# Patient Record
Sex: FEMALE | Race: WHITE | Employment: FULL TIME | URBAN - METROPOLITAN AREA
[De-identification: names, ages, dates, MRNs, and addresses within clinical notes are randomized per-mention and may not be internally consistent; named-entity substitution may affect disease eponyms.]

---

## 2017-05-15 DIAGNOSIS — Z30.41 ENCOUNTER FOR SURVEILLANCE OF CONTRACEPTIVE PILLS: ICD-10-CM

## 2017-05-16 NOTE — TELEPHONE ENCOUNTER
TC's: Please call Patient to schedule physical with PCP, then route back to the refill pool    Thank you    Mary Sandoval RN

## 2017-05-17 RX ORDER — NORETHINDRONE ACETATE AND ETHINYL ESTRADIOL 1.5; 3 MG/1; UG/1
TABLET ORAL
Qty: 84 TABLET | Refills: 3 | OUTPATIENT
Start: 2017-05-17

## 2017-05-21 ENCOUNTER — TELEPHONE (OUTPATIENT)
Dept: FAMILY MEDICINE | Facility: CLINIC | Age: 27
End: 2017-05-21

## 2017-05-21 DIAGNOSIS — R23.2 FLUSHING: ICD-10-CM

## 2017-05-21 DIAGNOSIS — F41.9 ANXIETY: ICD-10-CM

## 2017-05-22 NOTE — TELEPHONE ENCOUNTER
citalopram (CELEXA) 20 MG tablet 90 tablet 3 3/25/2016          Last Written Prescription Date: 3/25/16  Last Fill Quantity: 90, # refills: 3  Last Office Visit with [facility] primary care provider:  3/25/16        Last PHQ-9 score on record= No flowsheet data found.

## 2017-05-22 NOTE — TELEPHONE ENCOUNTER
TCs: Please call patient.   LOV was over 1 year ago.   Once scheduled, please route back to refills    Thank you!    Kaur Mcrae RN

## 2017-06-06 RX ORDER — CITALOPRAM HYDROBROMIDE 20 MG/1
TABLET ORAL
Qty: 90 TABLET | Refills: 2 | OUTPATIENT
Start: 2017-06-06

## 2017-06-06 NOTE — TELEPHONE ENCOUNTER
See previous refill encounter dated 5/15.  Patient seeing different provider outside [place].  Isabelle Neely RN

## 2018-04-23 ENCOUNTER — HEALTH MAINTENANCE LETTER (OUTPATIENT)
Age: 28
End: 2018-04-23

## 2020-02-20 ENCOUNTER — APPOINTMENT (OUTPATIENT)
Dept: MRI IMAGING | Facility: CLINIC | Age: 30
DRG: 818 | End: 2020-02-20
Attending: EMERGENCY MEDICINE
Payer: OTHER GOVERNMENT

## 2020-02-20 ENCOUNTER — HOSPITAL ENCOUNTER (INPATIENT)
Facility: CLINIC | Age: 30
LOS: 1 days | Discharge: HOME | DRG: 818 | End: 2020-02-22
Attending: EMERGENCY MEDICINE | Admitting: SURGERY
Payer: OTHER GOVERNMENT

## 2020-02-20 ENCOUNTER — APPOINTMENT (OUTPATIENT)
Dept: ULTRASOUND IMAGING | Facility: CLINIC | Age: 30
DRG: 818 | End: 2020-02-20
Attending: EMERGENCY MEDICINE
Payer: OTHER GOVERNMENT

## 2020-02-20 ENCOUNTER — ANESTHESIA (OUTPATIENT)
Dept: SURGERY | Facility: CLINIC | Age: 30
DRG: 818 | End: 2020-02-20
Payer: OTHER GOVERNMENT

## 2020-02-20 ENCOUNTER — ANESTHESIA EVENT (OUTPATIENT)
Dept: SURGERY | Facility: CLINIC | Age: 30
DRG: 818 | End: 2020-02-20
Payer: OTHER GOVERNMENT

## 2020-02-20 ENCOUNTER — SURGERY (OUTPATIENT)
Age: 30
End: 2020-02-20
Payer: OTHER GOVERNMENT

## 2020-02-20 DIAGNOSIS — K56.609 SBO (SMALL BOWEL OBSTRUCTION) (H): ICD-10-CM

## 2020-02-20 DIAGNOSIS — G89.18 POSTOPERATIVE PAIN: Primary | ICD-10-CM

## 2020-02-20 DIAGNOSIS — Z3A.01 LESS THAN 8 WEEKS GESTATION OF PREGNANCY: ICD-10-CM

## 2020-02-20 LAB
ALBUMIN SERPL-MCNC: 4 G/DL (ref 3.4–5)
ALBUMIN UR-MCNC: NEGATIVE MG/DL
ALP SERPL-CCNC: 59 U/L (ref 40–150)
ALT SERPL W P-5'-P-CCNC: 19 U/L (ref 0–50)
ANION GAP SERPL CALCULATED.3IONS-SCNC: 6 MMOL/L (ref 3–14)
APPEARANCE UR: CLEAR
AST SERPL W P-5'-P-CCNC: 13 U/L (ref 0–45)
BASOPHILS # BLD AUTO: 0 10E9/L (ref 0–0.2)
BASOPHILS NFR BLD AUTO: 0.2 %
BILIRUB SERPL-MCNC: 0.3 MG/DL (ref 0.2–1.3)
BILIRUB UR QL STRIP: NEGATIVE
BUN SERPL-MCNC: 7 MG/DL (ref 7–30)
CALCIUM SERPL-MCNC: 8.8 MG/DL (ref 8.5–10.1)
CHLORIDE SERPL-SCNC: 104 MMOL/L (ref 94–109)
CO2 SERPL-SCNC: 26 MMOL/L (ref 20–32)
COLOR UR AUTO: ABNORMAL
CREAT SERPL-MCNC: 0.73 MG/DL (ref 0.52–1.04)
DIFFERENTIAL METHOD BLD: NORMAL
EOSINOPHIL # BLD AUTO: 0.1 10E9/L (ref 0–0.7)
EOSINOPHIL NFR BLD AUTO: 0.7 %
ERYTHROCYTE [DISTWIDTH] IN BLOOD BY AUTOMATED COUNT: 13.2 % (ref 10–15)
GFR SERPL CREATININE-BSD FRML MDRD: >90 ML/MIN/{1.73_M2}
GLUCOSE SERPL-MCNC: 82 MG/DL (ref 70–99)
GLUCOSE UR STRIP-MCNC: NEGATIVE MG/DL
HCT VFR BLD AUTO: 41.2 % (ref 35–47)
HGB BLD-MCNC: 13.7 G/DL (ref 11.7–15.7)
HGB UR QL STRIP: NEGATIVE
IMM GRANULOCYTES # BLD: 0.1 10E9/L (ref 0–0.4)
IMM GRANULOCYTES NFR BLD: 0.5 %
KETONES UR STRIP-MCNC: NEGATIVE MG/DL
LEUKOCYTE ESTERASE UR QL STRIP: NEGATIVE
LIPASE SERPL-CCNC: 135 U/L (ref 73–393)
LYMPHOCYTES # BLD AUTO: 1.8 10E9/L (ref 0.8–5.3)
LYMPHOCYTES NFR BLD AUTO: 18.2 %
MCH RBC QN AUTO: 30.3 PG (ref 26.5–33)
MCHC RBC AUTO-ENTMCNC: 33.3 G/DL (ref 31.5–36.5)
MCV RBC AUTO: 91 FL (ref 78–100)
MONOCYTES # BLD AUTO: 0.7 10E9/L (ref 0–1.3)
MONOCYTES NFR BLD AUTO: 7.2 %
NEUTROPHILS # BLD AUTO: 7.1 10E9/L (ref 1.6–8.3)
NEUTROPHILS NFR BLD AUTO: 73.2 %
NITRATE UR QL: NEGATIVE
NRBC # BLD AUTO: 0 10*3/UL
NRBC BLD AUTO-RTO: 0 /100
PH UR STRIP: 6.5 PH (ref 5–7)
PLATELET # BLD AUTO: 241 10E9/L (ref 150–450)
POTASSIUM SERPL-SCNC: 3.3 MMOL/L (ref 3.4–5.3)
PROT SERPL-MCNC: 7.2 G/DL (ref 6.8–8.8)
RBC # BLD AUTO: 4.52 10E12/L (ref 3.8–5.2)
SODIUM SERPL-SCNC: 136 MMOL/L (ref 133–144)
SOURCE: ABNORMAL
SP GR UR STRIP: 1 (ref 1–1.03)
UROBILINOGEN UR STRIP-MCNC: NORMAL MG/DL (ref 0–2)
WBC # BLD AUTO: 9.6 10E9/L (ref 4–11)

## 2020-02-20 PROCEDURE — 44180 LAP ENTEROLYSIS: CPT | Performed by: SURGERY

## 2020-02-20 PROCEDURE — 36000056 ZZH SURGERY LEVEL 3 1ST 30 MIN: Performed by: SURGERY

## 2020-02-20 PROCEDURE — 27210794 ZZH OR GENERAL SUPPLY STERILE: Performed by: SURGERY

## 2020-02-20 PROCEDURE — 25000125 ZZHC RX 250: Performed by: EMERGENCY MEDICINE

## 2020-02-20 PROCEDURE — 25800030 ZZH RX IP 258 OP 636: Performed by: NURSE ANESTHETIST, CERTIFIED REGISTERED

## 2020-02-20 PROCEDURE — 25800030 ZZH RX IP 258 OP 636: Performed by: EMERGENCY MEDICINE

## 2020-02-20 PROCEDURE — 99285 EMERGENCY DEPT VISIT HI MDM: CPT | Mod: 25

## 2020-02-20 PROCEDURE — 76817 TRANSVAGINAL US OBSTETRIC: CPT

## 2020-02-20 PROCEDURE — 25000128 H RX IP 250 OP 636: Performed by: NURSE ANESTHETIST, CERTIFIED REGISTERED

## 2020-02-20 PROCEDURE — 96374 THER/PROPH/DIAG INJ IV PUSH: CPT | Mod: 59

## 2020-02-20 PROCEDURE — 83690 ASSAY OF LIPASE: CPT | Performed by: EMERGENCY MEDICINE

## 2020-02-20 PROCEDURE — 40000170 ZZH STATISTIC PRE-PROCEDURE ASSESSMENT II: Performed by: SURGERY

## 2020-02-20 PROCEDURE — 25000125 ZZHC RX 250: Performed by: NURSE ANESTHETIST, CERTIFIED REGISTERED

## 2020-02-20 PROCEDURE — 25800025 ZZH RX 258: Performed by: SURGERY

## 2020-02-20 PROCEDURE — 36000058 ZZH SURGERY LEVEL 3 EA 15 ADDTL MIN: Performed by: SURGERY

## 2020-02-20 PROCEDURE — 71000012 ZZH RECOVERY PHASE 1 LEVEL 1 FIRST HR: Performed by: SURGERY

## 2020-02-20 PROCEDURE — 25000132 ZZH RX MED GY IP 250 OP 250 PS 637: Performed by: EMERGENCY MEDICINE

## 2020-02-20 PROCEDURE — 37000009 ZZH ANESTHESIA TECHNICAL FEE, EACH ADDTL 15 MIN: Performed by: SURGERY

## 2020-02-20 PROCEDURE — 25000566 ZZH SEVOFLURANE, EA 15 MIN: Performed by: SURGERY

## 2020-02-20 PROCEDURE — 44180 LAP ENTEROLYSIS: CPT | Mod: AS | Performed by: PHYSICIAN ASSISTANT

## 2020-02-20 PROCEDURE — 76705 ECHO EXAM OF ABDOMEN: CPT

## 2020-02-20 PROCEDURE — 81003 URINALYSIS AUTO W/O SCOPE: CPT | Performed by: EMERGENCY MEDICINE

## 2020-02-20 PROCEDURE — 85025 COMPLETE CBC W/AUTO DIFF WBC: CPT | Performed by: EMERGENCY MEDICINE

## 2020-02-20 PROCEDURE — 99222 1ST HOSP IP/OBS MODERATE 55: CPT | Mod: 57 | Performed by: SURGERY

## 2020-02-20 PROCEDURE — 74181 MRI ABDOMEN W/O CONTRAST: CPT

## 2020-02-20 PROCEDURE — 37000008 ZZH ANESTHESIA TECHNICAL FEE, 1ST 30 MIN: Performed by: SURGERY

## 2020-02-20 PROCEDURE — 25000125 ZZHC RX 250: Performed by: SURGERY

## 2020-02-20 PROCEDURE — 80053 COMPREHEN METABOLIC PANEL: CPT | Performed by: EMERGENCY MEDICINE

## 2020-02-20 RX ORDER — PNV NO.95/FERROUS FUM/FOLIC AC 28MG-0.8MG
1 TABLET ORAL DAILY
COMMUNITY

## 2020-02-20 RX ORDER — SODIUM CHLORIDE 9 MG/ML
INJECTION, SOLUTION INTRAVENOUS CONTINUOUS PRN
Status: DISCONTINUED | OUTPATIENT
Start: 2020-02-20 | End: 2020-02-21

## 2020-02-20 RX ORDER — LIDOCAINE HYDROCHLORIDE 20 MG/ML
INJECTION, SOLUTION INFILTRATION; PERINEURAL PRN
Status: DISCONTINUED | OUTPATIENT
Start: 2020-02-20 | End: 2020-02-21

## 2020-02-20 RX ORDER — SODIUM CHLORIDE, SODIUM LACTATE, POTASSIUM CHLORIDE, CALCIUM CHLORIDE 600; 310; 30; 20 MG/100ML; MG/100ML; MG/100ML; MG/100ML
INJECTION, SOLUTION INTRAVENOUS CONTINUOUS PRN
Status: DISCONTINUED | OUTPATIENT
Start: 2020-02-20 | End: 2020-02-20

## 2020-02-20 RX ORDER — FENTANYL CITRATE 50 UG/ML
INJECTION, SOLUTION INTRAMUSCULAR; INTRAVENOUS PRN
Status: DISCONTINUED | OUTPATIENT
Start: 2020-02-20 | End: 2020-02-21

## 2020-02-20 RX ORDER — PROPOFOL 10 MG/ML
INJECTION, EMULSION INTRAVENOUS PRN
Status: DISCONTINUED | OUTPATIENT
Start: 2020-02-20 | End: 2020-02-21

## 2020-02-20 RX ORDER — ACETAMINOPHEN 325 MG/1
650 TABLET ORAL ONCE
Status: COMPLETED | OUTPATIENT
Start: 2020-02-20 | End: 2020-02-20

## 2020-02-20 RX ORDER — SODIUM CHLORIDE, SODIUM LACTATE, POTASSIUM CHLORIDE, CALCIUM CHLORIDE 600; 310; 30; 20 MG/100ML; MG/100ML; MG/100ML; MG/100ML
INJECTION, SOLUTION INTRAVENOUS CONTINUOUS
Status: CANCELLED | OUTPATIENT
Start: 2020-02-20

## 2020-02-20 RX ORDER — MAGNESIUM HYDROXIDE 1200 MG/15ML
LIQUID ORAL PRN
Status: DISCONTINUED | OUTPATIENT
Start: 2020-02-20 | End: 2020-02-21 | Stop reason: HOSPADM

## 2020-02-20 RX ORDER — CLINDAMYCIN PHOSPHATE 600 MG/50ML
600 INJECTION, SOLUTION INTRAVENOUS ONCE
Status: COMPLETED | OUTPATIENT
Start: 2020-02-20 | End: 2020-02-20

## 2020-02-20 RX ORDER — PROPOFOL 10 MG/ML
INJECTION, EMULSION INTRAVENOUS CONTINUOUS PRN
Status: DISCONTINUED | OUTPATIENT
Start: 2020-02-20 | End: 2020-02-21

## 2020-02-20 RX ADMIN — FENTANYL CITRATE 50 MCG: 50 INJECTION, SOLUTION INTRAMUSCULAR; INTRAVENOUS at 23:44

## 2020-02-20 RX ADMIN — SODIUM CHLORIDE: 9 INJECTION, SOLUTION INTRAVENOUS at 23:11

## 2020-02-20 RX ADMIN — SODIUM CHLORIDE 1000 ML: 900 IRRIGANT IRRIGATION at 23:12

## 2020-02-20 RX ADMIN — SODIUM CHLORIDE 1000 ML: 9 INJECTION, SOLUTION INTRAVENOUS at 22:43

## 2020-02-20 RX ADMIN — LIDOCAINE HYDROCHLORIDE 100 MG: 20 INJECTION, SOLUTION INFILTRATION; PERINEURAL at 23:17

## 2020-02-20 RX ADMIN — CLINDAMYCIN IN 5 PERCENT DEXTROSE 600 MG: 12 INJECTION, SOLUTION INTRAVENOUS at 22:44

## 2020-02-20 RX ADMIN — ROCURONIUM BROMIDE 30 MG: 10 INJECTION INTRAVENOUS at 23:30

## 2020-02-20 RX ADMIN — SUCCINYLCHOLINE CHLORIDE 80 MG: 20 INJECTION, SOLUTION INTRAMUSCULAR; INTRAVENOUS; PARENTERAL at 23:17

## 2020-02-20 RX ADMIN — FENTANYL CITRATE 50 MCG: 50 INJECTION, SOLUTION INTRAMUSCULAR; INTRAVENOUS at 23:17

## 2020-02-20 RX ADMIN — PROPOFOL 180 MG: 10 INJECTION, EMULSION INTRAVENOUS at 23:17

## 2020-02-20 RX ADMIN — PROPOFOL 30 MCG/KG/MIN: 10 INJECTION, EMULSION INTRAVENOUS at 23:29

## 2020-02-20 RX ADMIN — ACETAMINOPHEN 650 MG: 325 TABLET, FILM COATED ORAL at 17:42

## 2020-02-20 RX ADMIN — SODIUM CHLORIDE 1000 ML: 900 IRRIGANT IRRIGATION at 23:45

## 2020-02-20 ASSESSMENT — ENCOUNTER SYMPTOMS: SEIZURES: 0

## 2020-02-20 ASSESSMENT — MIFFLIN-ST. JEOR: SCORE: 1480.65

## 2020-02-20 NOTE — ED TRIAGE NOTES
Pt. Started having abdominal cramping last night. Denies vaginal bleeding. Pt. States she is 7 weeks pregnant.

## 2020-02-21 PROBLEM — K56.609 SBO (SMALL BOWEL OBSTRUCTION) (H): Status: ACTIVE | Noted: 2020-02-21

## 2020-02-21 LAB
CREAT SERPL-MCNC: 0.81 MG/DL (ref 0.52–1.04)
GFR SERPL CREATININE-BSD FRML MDRD: >90 ML/MIN/{1.73_M2}
GLUCOSE BLDC GLUCOMTR-MCNC: 90 MG/DL (ref 70–99)
PLATELET # BLD AUTO: 202 10E9/L (ref 150–450)
POTASSIUM SERPL-SCNC: 3.5 MMOL/L (ref 3.4–5.3)

## 2020-02-21 PROCEDURE — 85049 AUTOMATED PLATELET COUNT: CPT | Performed by: PHYSICIAN ASSISTANT

## 2020-02-21 PROCEDURE — 36415 COLL VENOUS BLD VENIPUNCTURE: CPT | Performed by: PHYSICIAN ASSISTANT

## 2020-02-21 PROCEDURE — 00000146 ZZHCL STATISTIC GLUCOSE BY METER IP

## 2020-02-21 PROCEDURE — 25000128 H RX IP 250 OP 636: Performed by: SURGERY

## 2020-02-21 PROCEDURE — 25800030 ZZH RX IP 258 OP 636: Performed by: PHYSICIAN ASSISTANT

## 2020-02-21 PROCEDURE — 84132 ASSAY OF SERUM POTASSIUM: CPT | Performed by: SURGERY

## 2020-02-21 PROCEDURE — 25000128 H RX IP 250 OP 636: Performed by: PHYSICIAN ASSISTANT

## 2020-02-21 PROCEDURE — 25000125 ZZHC RX 250: Performed by: NURSE ANESTHETIST, CERTIFIED REGISTERED

## 2020-02-21 PROCEDURE — 0DN84ZZ RELEASE SMALL INTESTINE, PERCUTANEOUS ENDOSCOPIC APPROACH: ICD-10-PCS | Performed by: SURGERY

## 2020-02-21 PROCEDURE — 25000132 ZZH RX MED GY IP 250 OP 250 PS 637: Performed by: PHYSICIAN ASSISTANT

## 2020-02-21 PROCEDURE — 25800030 ZZH RX IP 258 OP 636: Performed by: NURSE ANESTHETIST, CERTIFIED REGISTERED

## 2020-02-21 PROCEDURE — 82565 ASSAY OF CREATININE: CPT | Performed by: PHYSICIAN ASSISTANT

## 2020-02-21 PROCEDURE — 84132 ASSAY OF SERUM POTASSIUM: CPT | Performed by: PHYSICIAN ASSISTANT

## 2020-02-21 PROCEDURE — 25000128 H RX IP 250 OP 636: Performed by: NURSE ANESTHETIST, CERTIFIED REGISTERED

## 2020-02-21 PROCEDURE — 12000000 ZZH R&B MED SURG/OB

## 2020-02-21 PROCEDURE — 25000132 ZZH RX MED GY IP 250 OP 250 PS 637: Performed by: SURGERY

## 2020-02-21 RX ORDER — ONDANSETRON 4 MG/1
4 TABLET, ORALLY DISINTEGRATING ORAL EVERY 30 MIN PRN
Status: DISCONTINUED | OUTPATIENT
Start: 2020-02-21 | End: 2020-02-21 | Stop reason: HOSPADM

## 2020-02-21 RX ORDER — SODIUM CHLORIDE, SODIUM LACTATE, POTASSIUM CHLORIDE, CALCIUM CHLORIDE 600; 310; 30; 20 MG/100ML; MG/100ML; MG/100ML; MG/100ML
INJECTION, SOLUTION INTRAVENOUS CONTINUOUS
Status: DISCONTINUED | OUTPATIENT
Start: 2020-02-21 | End: 2020-02-22 | Stop reason: HOSPADM

## 2020-02-21 RX ORDER — SODIUM CHLORIDE, SODIUM LACTATE, POTASSIUM CHLORIDE, CALCIUM CHLORIDE 600; 310; 30; 20 MG/100ML; MG/100ML; MG/100ML; MG/100ML
INJECTION, SOLUTION INTRAVENOUS CONTINUOUS
Status: DISCONTINUED | OUTPATIENT
Start: 2020-02-21 | End: 2020-02-21 | Stop reason: HOSPADM

## 2020-02-21 RX ORDER — ONDANSETRON 2 MG/ML
INJECTION INTRAMUSCULAR; INTRAVENOUS PRN
Status: DISCONTINUED | OUTPATIENT
Start: 2020-02-21 | End: 2020-02-21

## 2020-02-21 RX ORDER — HYDROMORPHONE HYDROCHLORIDE 1 MG/ML
.3-.5 INJECTION, SOLUTION INTRAMUSCULAR; INTRAVENOUS; SUBCUTANEOUS
Status: DISCONTINUED | OUTPATIENT
Start: 2020-02-21 | End: 2020-02-22 | Stop reason: HOSPADM

## 2020-02-21 RX ORDER — NALOXONE HYDROCHLORIDE 0.4 MG/ML
.1-.4 INJECTION, SOLUTION INTRAMUSCULAR; INTRAVENOUS; SUBCUTANEOUS
Status: DISCONTINUED | OUTPATIENT
Start: 2020-02-21 | End: 2020-02-21

## 2020-02-21 RX ORDER — ONDANSETRON 4 MG/1
4 TABLET, ORALLY DISINTEGRATING ORAL EVERY 6 HOURS PRN
Status: DISCONTINUED | OUTPATIENT
Start: 2020-02-21 | End: 2020-02-22 | Stop reason: HOSPADM

## 2020-02-21 RX ORDER — NEOSTIGMINE METHYLSULFATE 1 MG/ML
VIAL (ML) INJECTION PRN
Status: DISCONTINUED | OUTPATIENT
Start: 2020-02-21 | End: 2020-02-21

## 2020-02-21 RX ORDER — LIDOCAINE 40 MG/G
CREAM TOPICAL
Status: DISCONTINUED | OUTPATIENT
Start: 2020-02-21 | End: 2020-02-22 | Stop reason: HOSPADM

## 2020-02-21 RX ORDER — OXYCODONE HYDROCHLORIDE 5 MG/1
5-10 TABLET ORAL
Status: DISCONTINUED | OUTPATIENT
Start: 2020-02-21 | End: 2020-02-22 | Stop reason: HOSPADM

## 2020-02-21 RX ORDER — BUPIVACAINE HYDROCHLORIDE 2.5 MG/ML
INJECTION, SOLUTION INFILTRATION; PERINEURAL PRN
Status: DISCONTINUED | OUTPATIENT
Start: 2020-02-21 | End: 2020-02-21 | Stop reason: HOSPADM

## 2020-02-21 RX ORDER — NALOXONE HYDROCHLORIDE 0.4 MG/ML
.1-.4 INJECTION, SOLUTION INTRAMUSCULAR; INTRAVENOUS; SUBCUTANEOUS
Status: DISCONTINUED | OUTPATIENT
Start: 2020-02-21 | End: 2020-02-22 | Stop reason: HOSPADM

## 2020-02-21 RX ORDER — FENTANYL CITRATE 50 UG/ML
25-50 INJECTION, SOLUTION INTRAMUSCULAR; INTRAVENOUS
Status: DISCONTINUED | OUTPATIENT
Start: 2020-02-21 | End: 2020-02-21 | Stop reason: HOSPADM

## 2020-02-21 RX ORDER — ACETAMINOPHEN 325 MG/1
650 TABLET ORAL EVERY 4 HOURS PRN
Status: DISCONTINUED | OUTPATIENT
Start: 2020-02-24 | End: 2020-02-22 | Stop reason: HOSPADM

## 2020-02-21 RX ORDER — PRENATAL VIT/IRON FUM/FOLIC AC 27MG-0.8MG
1 TABLET ORAL DAILY
Status: DISCONTINUED | OUTPATIENT
Start: 2020-02-21 | End: 2020-02-22 | Stop reason: HOSPADM

## 2020-02-21 RX ORDER — ACETAMINOPHEN 325 MG/1
975 TABLET ORAL EVERY 8 HOURS
Status: DISCONTINUED | OUTPATIENT
Start: 2020-02-21 | End: 2020-02-22 | Stop reason: HOSPADM

## 2020-02-21 RX ORDER — HYDROMORPHONE HYDROCHLORIDE 1 MG/ML
.3-.5 INJECTION, SOLUTION INTRAMUSCULAR; INTRAVENOUS; SUBCUTANEOUS EVERY 5 MIN PRN
Status: DISCONTINUED | OUTPATIENT
Start: 2020-02-21 | End: 2020-02-21 | Stop reason: HOSPADM

## 2020-02-21 RX ORDER — GLYCOPYRROLATE 0.2 MG/ML
INJECTION, SOLUTION INTRAMUSCULAR; INTRAVENOUS PRN
Status: DISCONTINUED | OUTPATIENT
Start: 2020-02-21 | End: 2020-02-21

## 2020-02-21 RX ORDER — ONDANSETRON 2 MG/ML
4 INJECTION INTRAMUSCULAR; INTRAVENOUS EVERY 6 HOURS PRN
Status: DISCONTINUED | OUTPATIENT
Start: 2020-02-21 | End: 2020-02-22 | Stop reason: HOSPADM

## 2020-02-21 RX ORDER — SODIUM CHLORIDE, SODIUM LACTATE, POTASSIUM CHLORIDE, CALCIUM CHLORIDE 600; 310; 30; 20 MG/100ML; MG/100ML; MG/100ML; MG/100ML
INJECTION, SOLUTION INTRAVENOUS CONTINUOUS PRN
Status: DISCONTINUED | OUTPATIENT
Start: 2020-02-21 | End: 2020-02-21

## 2020-02-21 RX ORDER — PROCHLORPERAZINE MALEATE 10 MG
10 TABLET ORAL EVERY 6 HOURS PRN
Status: DISCONTINUED | OUTPATIENT
Start: 2020-02-21 | End: 2020-02-22 | Stop reason: HOSPADM

## 2020-02-21 RX ORDER — KETOROLAC TROMETHAMINE 30 MG/ML
INJECTION, SOLUTION INTRAMUSCULAR; INTRAVENOUS PRN
Status: DISCONTINUED | OUTPATIENT
Start: 2020-02-21 | End: 2020-02-21

## 2020-02-21 RX ORDER — ONDANSETRON 2 MG/ML
4 INJECTION INTRAMUSCULAR; INTRAVENOUS EVERY 30 MIN PRN
Status: DISCONTINUED | OUTPATIENT
Start: 2020-02-21 | End: 2020-02-21 | Stop reason: HOSPADM

## 2020-02-21 RX ADMIN — NEOSTIGMINE METHYLSULFATE 3 MG: 1 INJECTION, SOLUTION INTRAVENOUS at 00:37

## 2020-02-21 RX ADMIN — GLYCOPYRROLATE 0.6 MG: 0.2 INJECTION, SOLUTION INTRAMUSCULAR; INTRAVENOUS at 00:37

## 2020-02-21 RX ADMIN — PRENATAL VITAMINS-IRON FUMARATE 27 MG IRON-FOLIC ACID 0.8 MG TABLET 1 TABLET: at 09:04

## 2020-02-21 RX ADMIN — SODIUM CHLORIDE, POTASSIUM CHLORIDE, SODIUM LACTATE AND CALCIUM CHLORIDE: 600; 310; 30; 20 INJECTION, SOLUTION INTRAVENOUS at 06:24

## 2020-02-21 RX ADMIN — BUPIVACAINE HYDROCHLORIDE 28 ML: 2.5 INJECTION, SOLUTION EPIDURAL; INFILTRATION; INTRACAUDAL; PERINEURAL at 00:35

## 2020-02-21 RX ADMIN — SODIUM CHLORIDE, POTASSIUM CHLORIDE, SODIUM LACTATE AND CALCIUM CHLORIDE: 600; 310; 30; 20 INJECTION, SOLUTION INTRAVENOUS at 20:07

## 2020-02-21 RX ADMIN — SODIUM CHLORIDE, POTASSIUM CHLORIDE, SODIUM LACTATE AND CALCIUM CHLORIDE: 600; 310; 30; 20 INJECTION, SOLUTION INTRAVENOUS at 02:24

## 2020-02-21 RX ADMIN — ACETAMINOPHEN 975 MG: 325 TABLET, FILM COATED ORAL at 02:16

## 2020-02-21 RX ADMIN — Medication 1 LOZENGE: at 02:37

## 2020-02-21 RX ADMIN — ACETAMINOPHEN 975 MG: 325 TABLET, FILM COATED ORAL at 17:48

## 2020-02-21 RX ADMIN — ONDANSETRON 4 MG: 2 INJECTION INTRAMUSCULAR; INTRAVENOUS at 00:34

## 2020-02-21 RX ADMIN — Medication 1 LOZENGE: at 17:49

## 2020-02-21 RX ADMIN — KETOROLAC TROMETHAMINE 30 MG: 30 INJECTION, SOLUTION INTRAMUSCULAR at 00:34

## 2020-02-21 RX ADMIN — ENOXAPARIN SODIUM 40 MG: 40 INJECTION SUBCUTANEOUS at 20:42

## 2020-02-21 RX ADMIN — SODIUM CHLORIDE, POTASSIUM CHLORIDE, SODIUM LACTATE AND CALCIUM CHLORIDE: 600; 310; 30; 20 INJECTION, SOLUTION INTRAVENOUS at 00:07

## 2020-02-21 RX ADMIN — Medication 1 LOZENGE: at 09:07

## 2020-02-21 RX ADMIN — Medication 1 ML: at 21:16

## 2020-02-21 RX ADMIN — ACETAMINOPHEN 975 MG: 325 TABLET, FILM COATED ORAL at 09:04

## 2020-02-21 ASSESSMENT — ACTIVITIES OF DAILY LIVING (ADL)
ADLS_ACUITY_SCORE: 11
ADLS_ACUITY_SCORE: 11
ADLS_ACUITY_SCORE: 13
ADLS_ACUITY_SCORE: 11
ADLS_ACUITY_SCORE: 11

## 2020-02-21 NOTE — ED PROVIDER NOTES
Visit Date:   02/20/2020      CHIEF COMPLAINT:  Abdominal pain.      HISTORY OF PRESENT ILLNESS:  This is a 29-year-old woman who presents to the Emergency Department with abdominal pain.  Her pain began last evening and has persisted.  It is just to the right and slightly down from the umbilicus.  She says moving makes it worse.  She has had no pain urinating.  No bowel movements.  No vaginal discharge or drainage, but she is 7 weeks pregnant.  Her appetite has been down.  She denies fevers or shaking chills and she has not taken anything for the pain.  She has not experienced this pain before.  Her only abdominal surgery was when she was an infant and she had a part of her small bowel removed but she is not sure why.      PAST MEDICAL HISTORY:  Also significant for anxiety, neck mass and thyroid nodule.      CURRENT MEDICATIONS:  Only prenatal vitamins.      ALLERGIES:  CECLOR, ALTHOUGH SHE HAS HAD THIS FROM AND SHE IS NOT SURE IF IT TRULY IS.       FAMILY AND SOCIAL HISTORY:  She is .  She works in [employer].  Does not smoke or drink.      REVIEW OF SYSTEMS:  As noted, all other systems being negative.      PHYSICAL EXAMINATION:   GENERAL:  Reveals a thin white female, supine.   VITAL SIGNS:  Blood pressure 141/94, pulse 83, respirations 20, pulse ox 97% on room air.   HEENT:  Pupils equal, reactive.  Extraocular movements intact.  Nares and oropharynx are clear.   NECK:  Neck veins flat.   LUNGS:  Clear.   HEART:  Heart tones regular, no murmur, rubs or gallops.   ABDOMEN:  Soft.  There is tenderness in the right lower quadrant with deep palpation.  There is a surgical incision there as well.  2+ femoral pulses are noted.  There is no CVA tenderness.   MUSCULOSKELETAL:  No swelling or tenderness.   SKIN:  No rash.   LYMPHATICS:  No adenopathy.   NEUROLOGIC:  Awake, alert and appropriate.  Normal motor, sensation and coordination.   PSYCHIATRIC:  Normal, pleasant affect.      EMERGENCY DEPARTMENT COURSE:  We  discussed workup of abdominal pain and decided to start with ultrasounds, one to look at the right lower quadrant for appendicitis.  This was negative and also to do a pelvic exam with transvaginal Dopplers to rule out torsion.  She has had a previous ultrasound and was noted to have an intrauterine pregnancy.  Her pelvic ultrasound did show a 7-week 4-day intrauterine pregnancy and a small corpus luteal cyst on the left.  There is a small amount of free fluid in the cul-de-sac and right adnexa region.  The patient, however, was still having pain and the ultrasound and lab work did not explain what was going on.  Therefore, an MRI was obtained to rule out both appendicitis or any possible surgical problem, given her previous surgery. White count was 9.6, hemoglobin 13.7, platelet count 241.  Lipase 135.  Chemistries:  Potassium 3.3, otherwise normal.  Urine was unremarkable.  MRI, however, was concerning for a small-bowel obstruction, with closed loop nature, with some edema and fluid of the small bowel.  There is no obvious perforation or mass.  Given these findings, I contacted Dr. Colton Hernandes, on-call for Surgery, who agreed to come in and do a laparotomy. We gave her 1 dose of preoperative clindamycin.  The patient will be admitted, will going to the OR for further evaluation.      IMPRESSION:   1.  Small bowel obstruction.   2.  7-week 4-day IUP     PLAN:  As noted.         JACQUELINE ARCHER MD             D: 2020   T: 2020   MT: GABBY      Name:     FILOMENA GROSS   MRN:      [id number]        Account:      [id number]   :      1990           Visit Date:   2020      Document: [id number]

## 2020-02-21 NOTE — PHARMACY-ADMISSION MEDICATION HISTORY
Pharmacy Medication History  Admission medication history interview status for the 2/20/2020  admission is complete. See EPIC admission navigator for prior to admission medications     Medication history sources: Patient  Medication history source reliability: Good  Adherence assessment: Good    Medication reconciliation completed by provider prior to medication history? No    Time spent in this activity: 10 minutes      Prior to Admission medications    Medication Sig Last Dose Taking? Auth Provider   Prenatal Vit-Fe Fumarate-FA (PRENATAL VITAMIN) 27-0.8 MG PO TABS Take 1 tablet by mouth daily Past Week at Unknown time Yes Unknown, Entered By History

## 2020-02-21 NOTE — PROGRESS NOTES
"General Surgery Note    Stable S/P Dx Laparoscopy with LUCIANA for SBO  POD0    -Clears this am.  If tolerates, maybe advance to fulls for dinner.  -Encouraged ambulation  -Discussed surgery findings  -Dispo: home nest 1-2 days with return of bowel function    Doing well.  No nausea.  Sore but pain had before surgery is improved.    NAD, pleasant, A&O  /86 (BP Location: Left arm)   Pulse 82   Temp 98.1  F (36.7  C) (Oral)   Resp 18   Ht 1.778 m (5' 10\")   Wt 68 kg (150 lb)   LMP 12/29/2019   SpO2 96%   BMI 21.52 kg/m      Intake/Output Summary (Last 24 hours) at 2/21/2020 0904  Last data filed at 2/21/2020 0052  Gross per 24 hour   Intake 1400 ml   Output 4 ml   Net 1396 ml     No drainage from incisions  "

## 2020-02-21 NOTE — BRIEF OP NOTE
[facility]    Brief Operative Note    Pre-operative diagnosis: Bowel obstruction (H) [K56.609]  Post-operative diagnosis SBO, Intraabdominal Adhesions    Procedure: Procedure(s):  LAPAROSCOPIC LYSIS OF ADHESIONS    Surgeon: Surgeon(s) and Role:     * Colton Hernandes MD - Primary     * Zechariah Wheeler PA-C - Assisting     Anesthesia: General   Estimated blood loss: Less than 10 ml  Drains: None  Specimens: * No specimens in log *    Findings:  Many bowel to bowel adhesions.  No bowel to abdominal wall but some omentum stuck to abdominal wall.  Some clear yellow fluid in pelvis.  No bowel ischemia or transition point.  Most of the bowel mildly dilated however.  Appendix was normal.    Complications: None.  Implants: * No implants in log *

## 2020-02-21 NOTE — OP NOTE
General Surgery Operative Note    PREOPERATIVE DIAGNOSIS:  Bowel obstruction (H) [K56.609]    POSTOPERATIVE DIAGNOSIS:  Same, adhesive     Procedure(s):  LYSIS, ADHESIONS, LAPAROSCOPIC    ANESTHESIA:  General.      SURGEON:  Colton Hernandes MD    ASSISTANT:  Zechariah Wheeler PA-C. The physician assistant was medically necessary for their expertise in camera management, suctioning, and retraction    INDICATIONS: The patient has severe abdominal pain.  She is pregnant.  Abdominal MRI showed a possible bowel obstruction with a possible closed loop type.  The options were discussed with her in detail and she chose to proceed with exploration.  Risks of bleeding, infection, anesthesia, bowel injury, premature labor or fetal loss were frankly discussed.  She appeared to understand.    PROCEDURE:  The patient was taken to the operating suite.  The operative area was prepped and draped in a sterile fashion.  Surgeon initiated timeout was acknowledged.  Under general anesthesia the abdomen was insufflated through a left upper quadrant incision using a varies needle.  3 L were placed with low pressures.  A 5 Mm trocar was introduced.  There were numerous adhesions and some dilated bowel visible.  I placed 5 mm trochars in the right upper and left lower quadrants.  There was some clear fluid in the pelvis that was suctioned out.  We identified the cecum.  The right lower quadrant was caught in a nest of dense adhesions.  There is dilated bowel proximal to this.  I went proximal and could not readily identify the ligament of Treitz.  All this bowel appeared to have gentle curves with no twists or kinks proximally.  We went down to the mid bowel which had been in the pelvis brought this out.  We then approached the adhesions in the right lateral abdomen.  The remaining adhesions here and these were taken down with sharp dissection very carefully.  We did careful dissection for about an hour freeing up the cecum and right abdomen.   We then found the terminal ileum and a normal-appearing appendix.  We lysed adhesions to this terminal ileum going backwards until it got to the area of moderately dilated bowel.  Approximately the bowel was no more dilated.  I believe we had released what ever relative partial obstruction was present due to this adhesive disease in the right side of the abdomen.  There is no evidence of perforation.  There is no bleeding.  The omentum duodenum and colon were observed and unharmed.  We ran the bowel again from distal to proximal as far as could be done.  I then desufflated the abdomen under direct vision.  Local anesthetic was instilled into all the incisions.  The skin edges were reapproximated with 4-0 Vicryl and Steri-Strips.  The patient was  awakened and taken to the PACU in stable condition.  At the conclusion of the case, all counts were correct.        INTRAOPERATIVE FINDINGS: Adhesive disease on the right side of the abdomen.  No mass, volvulus or internal hernia.    Colton Hernandes MD

## 2020-02-21 NOTE — H&P
General Surgery [facility] Consultation/H&P    Conchita Boss MRN#: [id number]   Age: 29 year old YOB: 1990     Date of Admission:          2020  Reason for consult/H&P: Possible bowel obstruction   Surgeon:      Colton Hernandes MD                  Chief Complaint:   Abdominal pain         History of Present Illness:   This patient is a 29 year old  female who presented to the [facility] ER with abdominal pain.  This began abruptly yesterday after eating.  It was generalized.  She felt nauseated but did not vomit.  The pain got a bit better today.  She called her obstetrician who suggested she come to the hospital for an ultrasound.  Ultrasound was not revealing but MRI revealed some thickened bowel loops with a question of a closed-loop bowel obstruction.  The patient had a bowel resection as a .  She has had some episodes of pain like this but none this severe.  She had a bowel movement this morning.  She currently feels bloated.  No recent trauma to the abdomen.  No history of jaundice.. Denies fever, chills, nausea, vomiting, change in BM or urination.    History is obtained from the patient and chart.         Past Medical History:    has a past medical history of Anxiety, Flushing, Proteinuria (2015), and Thyroid nodule (2016).          Past Surgical History:     Past Surgical History:   Procedure Laterality Date     OTHER SURGICAL HISTORY      partial resection of small bowel as an infant due to obstruction            Medications:     Prior to Admission medications    Medication Sig Start Date End Date Taking? Authorizing Provider   Prenatal Vit-Fe Fumarate-FA (PRENATAL VITAMIN) 27-0.8 MG PO TABS Take 1 tablet by mouth daily   Yes Unknown, Entered By History            Allergies:     Allergies   Allergen Reactions     Ceclor [Cefaclor] Hives            Social History:     Social History     Tobacco Use     Smoking status: Never Smoker     Smokeless tobacco: Never  "Used   Substance Use Topics     Alcohol use: Yes     Alcohol/week: 2.0 standard drinks     Types: 2 Standard drinks or equivalent per week     Comment: 2-3 drinks per week             Family History:    This patient has no significant relevant family history.  Family history is reviewed in detail.          Review of Systems:   Complete ROS is negative other than noted in the HPI.  C: NEGATIVE for fever, chills, change in weight  R: NEGATIVE for significant cough or SOB  CV: NEGATIVE for chest pain, palpitations or peripheral edema  GI:  NEGATIVE for dysuria, heartburn, or change in bowel habits  H: NEGATIVE for bleeding problems         Physical Exam:   Blood pressure (!) 141/94, pulse 83, temperature 98.4  F (36.9  C), temperature source Temporal, resp. rate 20, height 1.778 m (5' 10\"), weight 68 kg (150 lb), last menstrual period 12/29/2019, SpO2 97 %.  No intake/output data recorded.    General - This is a well developed, well nourished female .  HEENT - Normocephalic. Atraumatic. Moist mucous membranes. Pupils equal.  No scleral icterus. Nose normal.  Neck - Supple without masses. No cervical adenopathy or thyromegaly  Lungs - Breathing not labored  Chest - Not tender. CVA's nontender  Heart - Regular rate & rhythm   Abdomen - Soft, somewhat bloated.  She is tender diffusely but more on the right in the lower abdomen than on the left.  There is some referred tenderness from the left to the right., , no organomegaly.  Extremities - Moves all extremities. Warm without edema. Pulses noted  Neurologic - Nonfocal. Alert and oriented          Data:   Labs:  Recent Labs   Lab Test 02/20/20  1725 03/25/16  1524 07/01/15  1248 02/20/15  1042   WBC 9.6 9.1 12.9* 5.2   HGB 13.7 12.9  --  12.9   HCT 41.2 40.3  --  40.7    205  --  219     Recent Labs   Lab Test 02/20/20  1725 03/25/16  1524 02/20/15  1042   POTASSIUM 3.3* 3.8 3.9   CHLORIDE 104 105 107   CO2 26 28 26   BUN 7 16 11   CR 0.73 0.79 0.92     Recent Labs "   Lab Test 02/20/20  1725 07/01/15  1248 02/20/15  1042   BILITOTAL 0.3 0.5 0.3   ALT 19 24 19   AST 13 17 12   ALKPHOS 59 106 72   LIPASE 135 129  --      No lab results found.  Recent Labs   Lab Test 02/20/20  1725 03/25/16  1524 02/20/15  1042   SHERRIE 8.8 8.7 8.9     Recent Labs   Lab Test 02/20/20  1725 02/20/20  1652 03/25/16  1524 03/25/16  1523 07/01/15  1248 02/20/15  1045 02/20/15  1042   ANIONGAP 6  --  7  --   --   --  6   PROTEIN  --  Negative  --  Negative  --  30*  --    ALBUMIN 4.0  --   --   --  4.1  --  3.8       MRI scan of the abdomen: Images reviewed in detail.  There is some worrisome thickened bowel loops in the low pelvis.  The remainder the intestine looks more normal.  The proximal bowel somewhat dilated.  I do not see a spin or twist.    Ultrasound of the abdomen: Images reviewed in detail not really diagnostic other than a pregnancy.               Assessment:   Possible bowel obstruction that might be a closed loop or could be inflammatory bowel disease.  She is 7 weeks pregnant.  Some milder similar episodes in the past that resolved spontaneously.  She might have a resolved problem or she might have a serious twisted her intestine.  It is hard to tell from the MRI and the clinical picture.  I think laparoscopic or open exploration is the lowest risk process.  Given the possible risks closed-loop obstruction leading to perforation and sepsis I would recommend she undergo exploration tonight.  I reviewed the risks in detail with the patient and her  and she appears to understand.  She understands we might find nothing and might find something needing a bowel resection.         Plan:      Laparoscopic and possible open abdominal exploration tonight.     I have discussed the history, physical, and plan with the patient.   Colton Hernandes M.D.

## 2020-02-21 NOTE — PLAN OF CARE
Pt is A&Ox4, VSS, on RA, scheduled tylenol and ice packs for pain management. Port sites x3, closed with steri-strips and band-aids, scant amount of serosanguinous drainage present. LS clear, BS+ hypoactive, flatus-. Pt tolerating sips of water-advanced to clear liquid diet, denies NV.

## 2020-02-21 NOTE — ED NOTES
[facility]  ED Nurse Handoff Report    ED Chief complaint: Abdominal Pain      ED Diagnosis:   Final diagnoses:   SBO (small bowel obstruction) (H)   Less than 8 weeks gestation of pregnancy       Code Status: to be assessed by admitting provider     Allergies:   Allergies   Allergen Reactions     Ceclor [Cefaclor] Hives       Patient Story: Patient arrived to the ED today with complaints of abdominal pain. Patient states that at 1030 last night she began having right lower quadrant abdominal pain which she rates at a steady 3/10. Patient states that last night at one point her pain was a 10/10 but states her pain decreased back to her baseline throughout the night. Patient is 7.5 weeks pregnant and came to the ED after speaking to the OB nurses. Patient reported that she was having worsening pain with eating.   Focused Assessment:    Respiratory: WDL   Cardiac: hypertensive   Neuro: WDL   GI: Patina thas abdominal pain that she rates at a steady 3/10. Worsens with eating and movement.   : WDL   OBGYN: 7.5 weeks pregnant     Treatments and/or interventions provided: antibiotics, fluids, tylenol   Patient's response to treatments and/or interventions: improvement of pain     To be done/followed up on inpatient unit:  post op. Continue to monitor     Does this patient have any cognitive concerns?: none     Activity level - Baseline/Home:  Independent  Activity Level - Current:   Independent    Patient's Preferred language: English   Needed?: No    Isolation: None  Infection: Not Applicable  Bariatric?: No    Vital Signs:   Vitals:    02/20/20 1915 02/20/20 1920 02/20/20 1925 02/20/20 2200   BP:    (!) 141/94   Pulse:    83   Resp:       Temp:       TempSrc:       SpO2: 98% 99% 99% 97%   Weight:       Height:           Cardiac Rhythm:     Was the PSS-3 completed:   Yes  What interventions are required if any?               Family Comments:  at bedside   OBS brochure/video  discussed/provided to patient/family: N/A              Name of person given brochure if not patient: n.a              Relationship to patient: n.a    For the majority of the shift this patient's behavior was Green.   Behavioral interventions performed were none .    ED NURSE PHONE NUMBER: *[phone]

## 2020-02-21 NOTE — ANESTHESIA CARE TRANSFER NOTE
Patient: Conchita Ohoward    Procedure(s):  LYSIS, ADHESIONS, LAPAROSCOPIC    Diagnosis: Bowel obstruction (H) [K56.609]  Diagnosis Additional Information: No value filed.    Anesthesia Type:   General, RSI, ETT     Note:  Airway :Nasal Cannula  Patient transferred to:PACU  Handoff Report: Identifed the Patient, Identified the Reponsible Provider, Reviewed the pertinent medical history, Discussed the surgical course, Reviewed Intra-OP anesthesia mangement and issues during anesthesia, Set expectations for post-procedure period and Allowed opportunity for questions and acknowledgement of understanding      Vitals: (Last set prior to Anesthesia Care Transfer)    CRNA VITALS  2/21/2020 0022 - 2/21/2020 0055      2/21/2020             Resp Rate (set):  10                Electronically Signed By: MINESH Gonzalez CRNA  February 21, 2020  12:55 AM

## 2020-02-21 NOTE — ANESTHESIA PREPROCEDURE EVALUATION
Anesthesia Pre-Procedure Evaluation    Patient: Conchita Boss   MRN: [id number] : 1990          Preoperative Diagnosis: Bowel obstruction (H) [K56.609]    Procedure(s):  LYSIS, ADHESIONS, LAPAROSCOPIC  EXCISION, SMALL INTESTINE, LAPAROSCOPIC    Past Medical History:   Diagnosis Date     Anxiety      Flushing      Proteinuria 2015     Thyroid nodule 2016     Past Surgical History:   Procedure Laterality Date     OTHER SURGICAL HISTORY      partial resection of small bowel as an infant due to obstruction     MR ABDOMEN WITHOUT CONTRAST 2020 9:30 PM      HISTORY: Right lower quadrant abdominal pain. Rule out appendicitis.       TECHNIQUE: Multisequence, multiplanar imaging of the abdomen and  pelvis is performed without contrast.     FINDINGS:      Abdomen/pelvis: Limited images through the upper abdominal organs are  within normal limits including the liver, spleen, gallbladder,  pancreas, adrenal glands and kidneys. No hydronephrosis. No enlarged  abdominal lymph nodes where imaged. The appendix is normal and located  in the posterior right pelvis adjacent to the lower rectum. There is a  several cm segment of abnormal-appearing proximal to mid ileum in the  lower anterior and right lower quadrant of the abdomen. Findings are  concerning for possible closed loop bowel obstruction. More proximal  small bowel dilatation is present. The abnormal-appearing bowel loops  demonstrate wall thickening, surrounding inflammation and there is a  small amount of adjacent ascites extending down into the pelvis.  Changes related to acute exacerbation of Crohn's disease remain in the  differential. The bladder, intrauterine gestational sac and adnexal  regions are unremarkable. Corpus luteal cyst left ovary is noted.                                                                      IMPRESSION:  1. Abnormal loop of proximal to mid ileum with wall thickening,  surrounding edema and trace ascites. More  proximal small bowel  dilatation is concerning for closed loop bowel obstruction. Acute  exacerbation of Crohn's disease remains in the differential. Correlate  with patient's clinical picture.  2. Abdominal organs are within normal limits.  3. Appendix is normal and located low in the posterior pelvis.    US OB LESS THAN 14 WEEKS WITH TRANSVAGINAL SINGLE  2/20/2020 7:00 PM     HISTORY: Pelvic pain.     TECHNIQUE: Transabdominal and transvaginal imaging were performed.   Transvaginal imaging was performed to better evaluate the uterus and  gestational sac.     COMPARISON:  None.     FINDINGS:       Estimated gestational age by current ultrasound measurement: 7 weeks 5  days.  Estimated date of delivery based on this ultrasound: 10/3/2020.  Patient reported LMP: 12/29/2019.  Estimated gestational age by reported LMP: 7 weeks 4 days.  Gestational sac: Unremarkable.     Crown-rump length: 1.4 cm.   Embryonic cardiac activity: 156 bpm.   Yolk sac: Present.  Subchorionic hemorrhage: None.     Right ovary: Unremarkable.  Left ovary: Corpus luteal cyst.  Adnexal mass: None.  Free pelvic fluid: Small amount in cul-de-sac and right adnexa.                                                                      IMPRESSION: Live intrauterine pregnancy measures 7 weeks 5 days with  an estimated date of delivery 10/3/2020. This is consistent with LMP  dating with a stated LMP date 12/29/2019.     Anesthesia Evaluation     . Pt has had prior anesthetic.     No history of anesthetic complications          ROS/MED HX    ENT/Pulmonary:     (+)asthma (asthma as a child) , . .   (-) sleep apnea   Neurologic:  - neg neurologic ROS    (-) seizures, CVA and migraines   Cardiovascular:     (+) hypertension (HTN in past requiring, but no current issues. Felt to be related to birth control pills)----. : . . . :. .       METS/Exercise Tolerance:     Hematologic:  - neg hematologic  ROS       Musculoskeletal:  - neg musculoskeletal ROS      "  GI/Hepatic: Comment: Right lower quadrant pain       (-) GERD   Renal/Genitourinary:  - ROS Renal section negative       Endo:     (+) thyroid problem  Thyroid disease - Other thyroid nodule, .      Psychiatric:     (+) psychiatric history anxiety      Infectious Disease:  - neg infectious disease ROS       Malignancy:      - no malignancy   Other: Comment: Pregnant < 14 weeks by ultrasound   (+) Possibly pregnant                         Physical Exam  Normal systems: cardiovascular, pulmonary and dental    Airway   Mallampati: III  TM distance: >3 FB  Neck ROM: full    Dental   Comment: Prominent frontal incisors    Cardiovascular   Rhythm and rate: regular and normal      Pulmonary    breath sounds clear to auscultation            Lab Results   Component Value Date    WBC 9.6 02/20/2020    HGB 13.7 02/20/2020    HCT 41.2 02/20/2020     02/20/2020     02/20/2020    POTASSIUM 3.3 (L) 02/20/2020    CHLORIDE 104 02/20/2020    CO2 26 02/20/2020    BUN 7 02/20/2020    CR 0.73 02/20/2020    GLC 82 02/20/2020    SHERRIE 8.8 02/20/2020    ALBUMIN 4.0 02/20/2020    PROTTOTAL 7.2 02/20/2020    ALT 19 02/20/2020    AST 13 02/20/2020    ALKPHOS 59 02/20/2020    BILITOTAL 0.3 02/20/2020    LIPASE 135 02/20/2020    TSH 1.23 03/25/2016    T4 0.99 03/25/2016       Preop Vitals  BP Readings from Last 3 Encounters:   02/20/20 (!) 141/94   04/19/16 134/76   03/31/16 150/90    Pulse Readings from Last 3 Encounters:   02/20/20 83   04/19/16 84   03/25/16 73      Resp Readings from Last 3 Encounters:   02/20/20 20    SpO2 Readings from Last 3 Encounters:   02/20/20 97%   04/19/16 98%   03/25/16 97%      Temp Readings from Last 1 Encounters:   02/20/20 36.9  C (98.4  F) (Temporal)    Ht Readings from Last 1 Encounters:   02/20/20 1.778 m (5' 10\")      Wt Readings from Last 1 Encounters:   02/20/20 68 kg (150 lb)    Estimated body mass index is 21.52 kg/m  as calculated from the following:    Height as of this encounter: 1.778 " "m (5' 10\").    Weight as of this encounter: 68 kg (150 lb).       Anesthesia Plan      History & Physical Review  History and physical reviewed and following examination; no interval change.    ASA Status:  2 emergent.    NPO Status:  > 8 hours    Plan for General, RSI and ETT with Propofol induction. Maintenance will be Balanced.    PONV prophylaxis:  Ondansetron (or other 5HT-3)  Additional equipment: Videolaryngoscope Propofol infusion    Patient counseled for possible uterine irritability given location of surgery.       Postoperative Care  Postoperative pain management:  IV analgesics.      Consents  Anesthetic plan, risks, benefits and alternatives discussed with:  Patient..                 Douglas Arreguin MD  "

## 2020-02-22 ENCOUNTER — APPOINTMENT (OUTPATIENT)
Dept: ULTRASOUND IMAGING | Facility: CLINIC | Age: 30
DRG: 818 | End: 2020-02-22
Attending: PHYSICIAN ASSISTANT
Payer: OTHER GOVERNMENT

## 2020-02-22 VITALS
SYSTOLIC BLOOD PRESSURE: 139 MMHG | BODY MASS INDEX: 21.47 KG/M2 | WEIGHT: 150 LBS | HEIGHT: 70 IN | DIASTOLIC BLOOD PRESSURE: 98 MMHG | RESPIRATION RATE: 15 BRPM | OXYGEN SATURATION: 96 % | HEART RATE: 76 BPM | TEMPERATURE: 98.3 F

## 2020-02-22 LAB
GLUCOSE BLDC GLUCOMTR-MCNC: 74 MG/DL (ref 70–99)
HGB BLD-MCNC: 11.6 G/DL (ref 11.7–15.7)

## 2020-02-22 PROCEDURE — 25000132 ZZH RX MED GY IP 250 OP 250 PS 637: Performed by: PHYSICIAN ASSISTANT

## 2020-02-22 PROCEDURE — 25800030 ZZH RX IP 258 OP 636: Performed by: PHYSICIAN ASSISTANT

## 2020-02-22 PROCEDURE — 93970 EXTREMITY STUDY: CPT

## 2020-02-22 PROCEDURE — 85018 HEMOGLOBIN: CPT | Performed by: PHYSICIAN ASSISTANT

## 2020-02-22 PROCEDURE — 00000146 ZZHCL STATISTIC GLUCOSE BY METER IP

## 2020-02-22 PROCEDURE — 25000128 H RX IP 250 OP 636: Performed by: SURGERY

## 2020-02-22 PROCEDURE — 90686 IIV4 VACC NO PRSV 0.5 ML IM: CPT | Performed by: SURGERY

## 2020-02-22 PROCEDURE — 36415 COLL VENOUS BLD VENIPUNCTURE: CPT | Performed by: PHYSICIAN ASSISTANT

## 2020-02-22 RX ORDER — SIMETHICONE 80 MG
80 TABLET,CHEWABLE ORAL EVERY 6 HOURS PRN
Status: DISCONTINUED | OUTPATIENT
Start: 2020-02-22 | End: 2020-02-22 | Stop reason: HOSPADM

## 2020-02-22 RX ORDER — OXYCODONE HYDROCHLORIDE 5 MG/1
5 TABLET ORAL
Qty: 5 TABLET | Refills: 0 | Status: ON HOLD | OUTPATIENT
Start: 2020-02-22 | End: 2020-08-11

## 2020-02-22 RX ORDER — ACETAMINOPHEN 325 MG/1
650 TABLET ORAL EVERY 4 HOURS PRN
Status: ON HOLD | COMMUNITY
Start: 2020-02-24 | End: 2020-08-11

## 2020-02-22 RX ORDER — SIMETHICONE 80 MG
80 TABLET,CHEWABLE ORAL EVERY 6 HOURS PRN
Qty: 20 TABLET | Refills: 0 | Status: ON HOLD | OUTPATIENT
Start: 2020-02-22 | End: 2020-08-11

## 2020-02-22 RX ADMIN — ACETAMINOPHEN 975 MG: 325 TABLET, FILM COATED ORAL at 13:24

## 2020-02-22 RX ADMIN — SODIUM CHLORIDE, POTASSIUM CHLORIDE, SODIUM LACTATE AND CALCIUM CHLORIDE: 600; 310; 30; 20 INJECTION, SOLUTION INTRAVENOUS at 01:55

## 2020-02-22 RX ADMIN — ACETAMINOPHEN 975 MG: 325 TABLET, FILM COATED ORAL at 04:27

## 2020-02-22 RX ADMIN — PRENATAL VITAMINS-IRON FUMARATE 27 MG IRON-FOLIC ACID 0.8 MG TABLET 1 TABLET: at 08:06

## 2020-02-22 RX ADMIN — INFLUENZA A VIRUS A/BRISBANE/02/2018 IVR-190 (H1N1) ANTIGEN (FORMALDEHYDE INACTIVATED), INFLUENZA A VIRUS A/KANSAS/14/2017 X-327 (H3N2) ANTIGEN (FORMALDEHYDE INACTIVATED), INFLUENZA B VIRUS B/PHUKET/3073/2013 ANTIGEN (FORMALDEHYDE INACTIVATED), AND INFLUENZA B VIRUS B/MARYLAND/15/2016 BX-69A ANTIGEN (FORMALDEHYDE INACTIVATED) 0.5 ML: 15; 15; 15; 15 INJECTION, SUSPENSION INTRAMUSCULAR at 16:15

## 2020-02-22 ASSESSMENT — ACTIVITIES OF DAILY LIVING (ADL)
ADLS_ACUITY_SCORE: 11

## 2020-02-22 NOTE — PLAN OF CARE
AVS and discharge medications reviewed at the bedside with pt and her significant other; understanding verbalized. Discharged home.

## 2020-02-22 NOTE — DISCHARGE INSTRUCTIONS
[facility]  Discharge Instructions: Post-Operative Laparoscopic Surgery for Bowel Obstruction    ACTIVITY    Expect to feel tired after your surgery.  This will gradually resolve.      Take frequent, short walks and increase your activity gradually.      Avoid strenuous physical activity or heavy lifting greater than 15 lbs. for 2-3 weeks.  You may climb stairs.      You may drive without restrictions when you are not using any prescription pain medication and feel comfortable in a car.    You may return to work/school when you are comfortable without any prescription pain medication.    WOUND CARE    You may remove your Band-Aids and shower normally. Pat your incisions dry and leave them open to air.  Re-apply dressing (Band-Aids or gauze/tape) if you prefer for comfort or drainage.    You have steri-strips (looks like white tape) at your incisions.  You may peel off the steri-strips 2 weeks after your surgery.    Do not soak your incisions in a tub or pool for 2 weeks.     Do not apply any lotions, creams, or ointments to your incisions.    A ridge under your incisions is normal and will gradually resolve.    DIET    Stay on a low fiber diet until your follow up appointment.  Avoid heavy, spicy, greasy meals and gas forming foods, such as cabbage, broccoli, and onions.      You may find your appetite to be diminished briefly after surgery.  You may take nutritional supplement shakes if you are able.     Drink plenty of fluids to stay hydrated.    PAIN    Expect some tenderness and discomfort at the incision site(s).  Use the prescribed pain medication at your discretion.  Expect gradual resolution of your pain over several days.    Use the Tylenol or Simethicone (gas-X) for pain and cramping. If needed, you may take the narcotic pain medication.    Do not drink alcohol or drive while you are taking pain medications.    You may apply ice to your incisions in 20 minute  intervals as needed.    EXPECTATIONS    As discussed, your abdomen will feel somewhat bloated for a couple weeks. If this bloating worsens and/or if you develop nausea, vomiting, abdominal pain, abnormal bowel movements, or any other concerning symptoms - call our office or seek care in the ED. You may develop an ileus (when the bowels do not function normally) after surgery. Monitor for this and call us if needed.    If you are constipated, we prefer that you take an over the counter stool softener/stimulant, such as Colace or Senna, 1-2 times a day with plenty of water.  You may take a mild over the counter laxative, such as Miralax or a suppository, as needed.  You may discontinue these medications once you are having regular bowel movements and/or are no longer taking your narcotic pain medication.    For laparoscopic surgery, you may have shoulder or upper back discomfort due to the gas used in surgery.  This is temporary and should resolve in 48-72 hours.  Short, frequent walks may help with this.      RETURN APPOINTMENT    Follow up with your surgeon (Dr. Colton Hernandes) in approximately 2 weeks.  Please call our office at [phone] to schedule your appointment.  We are located at [address] [place] [address].    CALL OUR OFFICE -[phone] IF YOU HAVE:     Chills or fever above 101  F.    Increased redness, warmth, or drainage at your incisions.    Significant bleeding.    Pain not relieved by your pain medication or rest.    Worsening abdominal pain, bloating, nausea, vomiting    Increasing pain after the first 48 hours.    Any other concerns or questions.    Revised January 2018

## 2020-02-22 NOTE — PLAN OF CARE
A&Ox4. VSS on RA. Up independently. Voiding. BS active, +flatus, +BM x1. Full liquid diet. Pain with tylenol. Port sites WNL.

## 2020-02-22 NOTE — PLAN OF CARE
A&Ox4. VSS on RA. Up independently. Pain controlled with tylenol. Tolerating low fiber diet. US complete. Voiding. BS active, +BM.

## 2020-02-22 NOTE — PROGRESS NOTES
General Surgery - Chart Update    Patient tolerating diet. Ultrasound negative for DVT. OK to discharge. Follow-up with Dr. Hernandes in 2 weeks.      Peg Feldman PA-C  [facility]  [phone]

## 2020-02-22 NOTE — PLAN OF CARE
A/Ox4. VSS on RA. BSS,+flatus, -bm.  LS clear. 3 lap sites CDI. CMS intact. Pain controlled with scheduled tylenol.Up independent. Tolerating full liquid diet, pt c/o mild cramping pain with eating. Voiding adequately.

## 2020-02-22 NOTE — PROGRESS NOTES
"General Surgery Progress Note    Admission Date: 2/20/2020  Today's Date: 2/22/2020         Assessment:      Conchita Boss is a 29 year old female s/p exploratory laparoscopy with laparoscopic lysis of adhesions for small bowel obstruction POD 1.  Tolerating full liquid diet, pain controlled with tylenol, passing gas and having BMs.         Plan:   - Low fiber diet today, encouraged to go slowly as tolerated. Encourage PO fluids. Discussed potential for postop ileus after extensive lysis of adhesions. Reviewed signs/symptoms for which to monitor, call us if this develops.  - Pain meds available, currently using only Tylenol. Add simethicone for gas pain/cramping  - No bowel regimen needed  - Saline lock IV fluids  - PCDs, Lovenox while in hospital, ambulate multiple times per day. Due to calf pain, will obtain lower extremity ultrasound to rule out DVT  - Encourage IS use     Dispo: Likely home later today vs tomorrow pending continued tolerance of diet, ultrasound result. Follow-up with OBGYN as outpatient. Follow-up with Dr. Hernandes for postop check in approximately 2 weeks. Discharge instructions reviewed, questions answered.        Interval History:   Afebrile overnight, VSS on room air. Tolerating full liquid diet, no nausea, hungry for more food. Passing gas, had BM overnight. Pain she had prior to surgery is resolved, now with some incisional pain controlled with Tylenol. Occasionally cramping abdominal pain. Feeling much better today, walking frequently. She does mention some left calf pain that she has had intermittently for some time now, states that she notices it when she is dehydrated.          Physical Exam:   /89 (BP Location: Left arm)   Pulse 85   Temp 97.5  F (36.4  C) (Oral)   Resp 16   Ht 1.778 m (5' 10\")   Wt 68 kg (150 lb)   LMP 12/29/2019   SpO2 97%   BMI 21.52 kg/m    I/O last 3 completed shifts:  In: 2100 [P.O.:1000; I.V.:1100]  Out: 400 [Urine:400]  General: NAD, pleasant, alert " and oriented x3  Cardiovascular: regular rate and rhythm; S1 and S2 distinct without murmur   Respiratory: lungs clear to auscultation bilaterally without wheezes, rales or rhonchi   Abdomen: soft, appropriately tender around incisions, mildly distended, + bowel sounds  Incision: clean, dry, and intact. No erythema or drainage  Extremities: No lower extremity edema, no calf tenderness. Wearing PCDs    LABS:  Glucose: 74  Recent Labs   Lab Test 02/22/20  0901 02/21/20  0820 02/20/20  1725 03/25/16  1524 07/01/15  1248 02/20/15  1042   WBC  --   --  9.6 9.1 12.9* 5.2   HGB 11.6*  --  13.7 12.9  --  12.9   MCV  --   --  91 87  --  91   PLT  --  202 241 205  --  219      Recent Labs   Lab Test 02/21/20  0820 02/20/20  1725 03/25/16  1524 02/20/15  1042   POTASSIUM 3.5 3.3* 3.8 3.9   CHLORIDE  --  104 105 107   CO2  --  26 28 26   BUN  --  7 16 11   CR 0.81 0.73 0.79 0.92   ANIONGAP  --  6 7 6     -------------------------------    Peg Feldman PA-C  [facility]  [phone]

## 2020-02-24 NOTE — DISCHARGE SUMMARY
"[facility] Discharge Summary    Conchita Boss MRN# [id number]   Age: 29 year old YOB: 1990     Date of Admission:  2020  Date of Discharge:  2020  4:41 PM  Admitting Provider:  Colton Hernandes MD  Discharge Provider:  Zechariah Wheeler PA-C  Discharging Service: General Surgery     Primary Provider: Gabriela Ref-Primary, Physician  Primary Care Physician Phone Number: None          Admission Diagnoses:   Principle Diagnosis: SBO (small bowel obstruction) (H) [K56.609]  Less than 8 weeks gestation of pregnancy [Z3A.01]  Secondary Diagnoses: Hx of Ex Lap and bowel surgery as           Discharge Diagnosis:   Bowel obstruction (H) [K56.609]  Small bowel obstruction due to intraabdominal adhesions          Procedures:   Procedure(s): LAPAROSCOPIC LYSIS OF ADHESIONS            Discharge Medications:     Discharge Medication List as of 2020  4:24 PM      START taking these medications    Details   acetaminophen (TYLENOL) 325 MG tablet Take 2 tablets (650 mg) by mouth every 4 hours as needed for pain, OTC      oxyCODONE (ROXICODONE) 5 MG tablet Take 1 tablet (5 mg) by mouth every 3 hours as needed for moderate to severe pain, Disp-5 tablet, R-0, E-Prescribe      simethicone (MYLICON) 80 MG chewable tablet Take 1 tablet (80 mg) by mouth every 6 hours as needed for cramping, Disp-20 tablet, R-0, E-Prescribe         CONTINUE these medications which have NOT CHANGED    Details   Prenatal Vit-Fe Fumarate-FA (PRENATAL VITAMIN) 27-0.8 MG PO TABS Take 1 tablet by mouth daily, Historical                 Allergies:         Allergies   Allergen Reactions     Ceclor [Cefaclor] Hives             Brief History of Illness:    Reason for your hospital stay      Small bowel obstruction requiring surgery with Dr. Colton Hernandes         From Op Note Indications 2020:  \"The patient has severe abdominal pain.  She is pregnant.  Abdominal MRI showed a possible bowel obstruction with a possible closed loop " "type.  The options were discussed with her in detail and she chose to proceed with exploration.  Risks of bleeding, infection, anesthesia, bowel injury, premature labor or fetal loss were frankly discussed.  She appeared to understand.\"    After discussing the risks, benefits, and possible complications, informed consent was obtained and the patient underwent the above procedure.  There were no complications.  Please see the Operative Report for full details.           Hospital Course:   Conchita Boss's hospital course was unremarkable.  She recovered as anticipated and experienced no post-operative complications. Her diet was restarted the morning of surgery, which she tolerated clear liquids immediately and was therefore advanced prior to discharge.      Of note, on POD1 she reported calf tenderness.  This was worked up for DVT but her duplex US was negative for DVT.    On the date of discharge, the patient was discharged to home in stable condition and afebrile.  She verbalized understanding of all discharge instructions and felt comfortable with the discharge plan.  She was asked to call with any further questions or concerns.         Condition on Discharge:      Discharge condition: Stable   Discharge vitals: Blood pressure (!) 139/98, pulse 76, temperature 98.3  F (36.8  C), temperature source Oral, resp. rate 15, height 1.778 m (5' 10\"), weight 68 kg (150 lb), last menstrual period 12/29/2019, SpO2 96 %.           Discharge Disposition:   Discharged to home          Discharge Instructions and Follow-Up:      Conchita Boss was asked to follow up with surgical team in 1-2 weeks.      Zechariah Wheeler PA-C  Dictating on behalf of Dr. Hernandes  General Surgery  [facility]  [phone]     "

## 2020-03-01 ENCOUNTER — HEALTH MAINTENANCE LETTER (OUTPATIENT)
Age: 30
End: 2020-03-01

## 2020-03-10 LAB
HBV SURFACE AG SERPL QL IA: NON REACTIVE
HIV 1+2 AB+HIV1 P24 AG SERPL QL IA: NON REACTIVE
RUBELLA ANTIBODY IGG QUANTITATIVE: NORMAL IU/ML
TREPONEMA ANTIBODIES: NON REACTIVE

## 2020-03-18 ENCOUNTER — TELEPHONE (OUTPATIENT)
Dept: SURGERY | Facility: CLINIC | Age: 30
End: 2020-03-18

## 2020-03-18 NOTE — TELEPHONE ENCOUNTER
Procedure: laparoscopic lysis of adhesions (SBO)  Date: 02/20/2020  Surgeon: Cecilio    Patient scheduled for a post op this morning with Dr. Hernandes. She is currently pregnant and is feeling fine, wondering if it is okay to cancel.    She is eating and drinking without issue. Having bowel movements.  No s/s of infection at surgical site.    She has been maintaining a low fiber diet and wondering if she can advance.  Informed her to gradually begin returning to a regular diet. If she has issues, she should return to low fiber diet.  Also informed her to please call with any additional questions or concerns.    She agreed.    Sally Yanez, RN-BSN

## 2020-06-29 ENCOUNTER — TELEPHONE (OUTPATIENT)
Dept: OBGYN | Facility: CLINIC | Age: 30
End: 2020-06-29

## 2020-06-29 NOTE — TELEPHONE ENCOUNTER
Transfer Request at 26 weeks from [facility]    Patient added to Teams Transfer request page and given our fax number to send records.    Interested in seeing midwives. Patient told we will call her when we know status.

## 2020-07-24 ENCOUNTER — HOSPITAL ENCOUNTER (INPATIENT)
Facility: CLINIC | Age: 30
LOS: 18 days | Discharge: HOME | End: 2020-08-11
Attending: MIDWIFE | Admitting: OBSTETRICS & GYNECOLOGY
Payer: OTHER GOVERNMENT

## 2020-07-24 ENCOUNTER — TRANSFERRED RECORDS (OUTPATIENT)
Dept: HEALTH INFORMATION MANAGEMENT | Facility: CLINIC | Age: 30
End: 2020-07-24

## 2020-07-24 DIAGNOSIS — O13.3 GESTATIONAL HYPERTENSION, THIRD TRIMESTER: Primary | ICD-10-CM

## 2020-07-24 DIAGNOSIS — Z98.891 S/P PRIMARY LOW TRANSVERSE C-SECTION: ICD-10-CM

## 2020-07-24 PROBLEM — O13.9 GESTATIONAL HYPERTENSION: Status: ACTIVE | Noted: 2020-07-24

## 2020-07-24 PROBLEM — Z34.90 PREGNANCY: Status: ACTIVE | Noted: 2020-07-24

## 2020-07-24 LAB
ABO + RH BLD: NORMAL
ABO + RH BLD: NORMAL
ALT SERPL W P-5'-P-CCNC: 35 U/L (ref 0–50)
ALT SERPL W P-5'-P-CCNC: 36 U/L (ref 0–50)
AST SERPL W P-5'-P-CCNC: 27 U/L (ref 0–45)
AST SERPL W P-5'-P-CCNC: 32 U/L (ref 0–45)
BLD GP AB SCN SERPL QL: NORMAL
BLOOD BANK CMNT PATIENT-IMP: NORMAL
CREAT SERPL-MCNC: 0.9 MG/DL (ref 0.52–1.04)
CREAT SERPL-MCNC: 0.93 MG/DL (ref 0.52–1.04)
CREAT UR-MCNC: 21 MG/DL
ERYTHROCYTE [DISTWIDTH] IN BLOOD BY AUTOMATED COUNT: 13.6 % (ref 10–15)
ERYTHROCYTE [DISTWIDTH] IN BLOOD BY AUTOMATED COUNT: 13.7 % (ref 10–15)
GFR SERPL CREATININE-BSD FRML MDRD: 82 ML/MIN/{1.73_M2}
GFR SERPL CREATININE-BSD FRML MDRD: 85 ML/MIN/{1.73_M2}
HCT VFR BLD AUTO: 34.7 % (ref 35–47)
HCT VFR BLD AUTO: 37.9 % (ref 35–47)
HGB BLD-MCNC: 11.7 G/DL (ref 11.7–15.7)
HGB BLD-MCNC: 12.7 G/DL (ref 11.7–15.7)
HIV 1&2 EXT: NORMAL
LABORATORY COMMENT REPORT: NORMAL
MCH RBC QN AUTO: 30.7 PG (ref 26.5–33)
MCH RBC QN AUTO: 30.8 PG (ref 26.5–33)
MCHC RBC AUTO-ENTMCNC: 33.5 G/DL (ref 31.5–36.5)
MCHC RBC AUTO-ENTMCNC: 33.7 G/DL (ref 31.5–36.5)
MCV RBC AUTO: 91 FL (ref 78–100)
MCV RBC AUTO: 92 FL (ref 78–100)
PLATELET # BLD AUTO: 168 10E9/L (ref 150–450)
PLATELET # BLD AUTO: 171 10E9/L (ref 150–450)
PROT UR-MCNC: <0.05 G/L
PROT/CREAT 24H UR: NORMAL G/G CR (ref 0–0.2)
RBC # BLD AUTO: 3.8 10E12/L (ref 3.8–5.2)
RBC # BLD AUTO: 4.14 10E12/L (ref 3.8–5.2)
SARS-COV-2 RNA SPEC QL NAA+PROBE: NEGATIVE
SARS-COV-2 RNA SPEC QL NAA+PROBE: NORMAL
SPECIMEN EXP DATE BLD: NORMAL
SPECIMEN SOURCE: NORMAL
SPECIMEN SOURCE: NORMAL
WBC # BLD AUTO: 9.1 10E9/L (ref 4–11)
WBC # BLD AUTO: 9.8 10E9/L (ref 4–11)

## 2020-07-24 PROCEDURE — 25000128 H RX IP 250 OP 636: Performed by: OBSTETRICS & GYNECOLOGY

## 2020-07-24 PROCEDURE — 86900 BLOOD TYPING SEROLOGIC ABO: CPT | Performed by: OBSTETRICS & GYNECOLOGY

## 2020-07-24 PROCEDURE — 96372 THER/PROPH/DIAG INJ SC/IM: CPT

## 2020-07-24 PROCEDURE — 85027 COMPLETE CBC AUTOMATED: CPT | Performed by: MIDWIFE

## 2020-07-24 PROCEDURE — 82565 ASSAY OF CREATININE: CPT | Performed by: MIDWIFE

## 2020-07-24 PROCEDURE — 12000000 ZZH R&B MED SURG/OB

## 2020-07-24 PROCEDURE — 25800030 ZZH RX IP 258 OP 636: Performed by: OBSTETRICS & GYNECOLOGY

## 2020-07-24 PROCEDURE — 36415 COLL VENOUS BLD VENIPUNCTURE: CPT | Performed by: MIDWIFE

## 2020-07-24 PROCEDURE — U0003 INFECTIOUS AGENT DETECTION BY NUCLEIC ACID (DNA OR RNA); SEVERE ACUTE RESPIRATORY SYNDROME CORONAVIRUS 2 (SARS-COV-2) (CORONAVIRUS DISEASE [COVID-19]), AMPLIFIED PROBE TECHNIQUE, MAKING USE OF HIGH THROUGHPUT TECHNOLOGIES AS DESCRIBED BY CMS-2020-01-R: HCPCS | Performed by: OBSTETRICS & GYNECOLOGY

## 2020-07-24 PROCEDURE — 84460 ALANINE AMINO (ALT) (SGPT): CPT | Performed by: MIDWIFE

## 2020-07-24 PROCEDURE — 84460 ALANINE AMINO (ALT) (SGPT): CPT | Performed by: OBSTETRICS & GYNECOLOGY

## 2020-07-24 PROCEDURE — 25000132 ZZH RX MED GY IP 250 OP 250 PS 637: Performed by: OBSTETRICS & GYNECOLOGY

## 2020-07-24 PROCEDURE — 86901 BLOOD TYPING SEROLOGIC RH(D): CPT | Performed by: OBSTETRICS & GYNECOLOGY

## 2020-07-24 PROCEDURE — 84450 TRANSFERASE (AST) (SGOT): CPT | Performed by: OBSTETRICS & GYNECOLOGY

## 2020-07-24 PROCEDURE — 25000128 H RX IP 250 OP 636

## 2020-07-24 PROCEDURE — 86850 RBC ANTIBODY SCREEN: CPT | Performed by: OBSTETRICS & GYNECOLOGY

## 2020-07-24 PROCEDURE — 84156 ASSAY OF PROTEIN URINE: CPT | Performed by: MIDWIFE

## 2020-07-24 PROCEDURE — 85027 COMPLETE CBC AUTOMATED: CPT | Performed by: OBSTETRICS & GYNECOLOGY

## 2020-07-24 PROCEDURE — 87653 STREP B DNA AMP PROBE: CPT | Performed by: OBSTETRICS & GYNECOLOGY

## 2020-07-24 PROCEDURE — 96376 TX/PRO/DX INJ SAME DRUG ADON: CPT

## 2020-07-24 PROCEDURE — 82565 ASSAY OF CREATININE: CPT | Performed by: OBSTETRICS & GYNECOLOGY

## 2020-07-24 PROCEDURE — 84450 TRANSFERASE (AST) (SGOT): CPT | Performed by: MIDWIFE

## 2020-07-24 PROCEDURE — G0463 HOSPITAL OUTPT CLINIC VISIT: HCPCS | Mod: 25

## 2020-07-24 PROCEDURE — 96375 TX/PRO/DX INJ NEW DRUG ADDON: CPT

## 2020-07-24 PROCEDURE — 36415 COLL VENOUS BLD VENIPUNCTURE: CPT | Performed by: OBSTETRICS & GYNECOLOGY

## 2020-07-24 PROCEDURE — 96374 THER/PROPH/DIAG INJ IV PUSH: CPT

## 2020-07-24 PROCEDURE — 59025 FETAL NON-STRESS TEST: CPT | Mod: 59

## 2020-07-24 RX ORDER — MAGNESIUM SULFATE HEPTAHYDRATE 40 MG/ML
4 INJECTION, SOLUTION INTRAVENOUS
Status: COMPLETED | OUTPATIENT
Start: 2020-07-24 | End: 2020-08-06

## 2020-07-24 RX ORDER — MAGNESIUM SULFATE HEPTAHYDRATE 40 MG/ML
4 INJECTION, SOLUTION INTRAVENOUS ONCE
Status: COMPLETED | OUTPATIENT
Start: 2020-07-24 | End: 2020-07-24

## 2020-07-24 RX ORDER — AMOXICILLIN 250 MG
1 CAPSULE ORAL 2 TIMES DAILY
Status: DISCONTINUED | OUTPATIENT
Start: 2020-07-24 | End: 2020-08-06

## 2020-07-24 RX ORDER — LABETALOL HYDROCHLORIDE 5 MG/ML
20 INJECTION, SOLUTION INTRAVENOUS EVERY 10 MIN PRN
Status: DISCONTINUED | OUTPATIENT
Start: 2020-07-24 | End: 2020-08-06

## 2020-07-24 RX ORDER — LABETALOL HYDROCHLORIDE 5 MG/ML
INJECTION, SOLUTION INTRAVENOUS
Status: COMPLETED
Start: 2020-07-24 | End: 2020-07-24

## 2020-07-24 RX ORDER — BETAMETHASONE SODIUM PHOSPHATE AND BETAMETHASONE ACETATE 3; 3 MG/ML; MG/ML
12 INJECTION, SUSPENSION INTRA-ARTICULAR; INTRALESIONAL; INTRAMUSCULAR; SOFT TISSUE EVERY 24 HOURS
Status: COMPLETED | OUTPATIENT
Start: 2020-07-24 | End: 2020-07-25

## 2020-07-24 RX ORDER — NIFEDIPINE 10 MG/1
10 CAPSULE ORAL
Status: DISCONTINUED | OUTPATIENT
Start: 2020-07-24 | End: 2020-08-11 | Stop reason: HOSPADM

## 2020-07-24 RX ORDER — NIFEDIPINE 10 MG/1
CAPSULE ORAL
Status: DISCONTINUED
Start: 2020-07-24 | End: 2020-07-24 | Stop reason: HOSPADM

## 2020-07-24 RX ORDER — LIDOCAINE 40 MG/G
CREAM TOPICAL
Status: DISCONTINUED | OUTPATIENT
Start: 2020-07-24 | End: 2020-08-06

## 2020-07-24 RX ORDER — MAGNESIUM SULFATE HEPTAHYDRATE 40 MG/ML
2 INJECTION, SOLUTION INTRAVENOUS
Status: DISCONTINUED | OUTPATIENT
Start: 2020-07-24 | End: 2020-07-24

## 2020-07-24 RX ORDER — MAGNESIUM SULFATE IN WATER 40 MG/ML
INJECTION, SOLUTION INTRAVENOUS
Status: DISCONTINUED
Start: 2020-07-24 | End: 2020-07-24 | Stop reason: HOSPADM

## 2020-07-24 RX ORDER — ACETAMINOPHEN 325 MG/1
650 TABLET ORAL EVERY 6 HOURS PRN
Status: DISCONTINUED | OUTPATIENT
Start: 2020-07-24 | End: 2020-08-06

## 2020-07-24 RX ORDER — SODIUM CHLORIDE, SODIUM LACTATE, POTASSIUM CHLORIDE, CALCIUM CHLORIDE 600; 310; 30; 20 MG/100ML; MG/100ML; MG/100ML; MG/100ML
10-125 INJECTION, SOLUTION INTRAVENOUS CONTINUOUS
Status: DISCONTINUED | OUTPATIENT
Start: 2020-07-24 | End: 2020-08-11 | Stop reason: HOSPADM

## 2020-07-24 RX ORDER — ONDANSETRON 2 MG/ML
4 INJECTION INTRAMUSCULAR; INTRAVENOUS EVERY 6 HOURS PRN
Status: DISCONTINUED | OUTPATIENT
Start: 2020-07-24 | End: 2020-07-24

## 2020-07-24 RX ORDER — BETAMETHASONE SODIUM PHOSPHATE AND BETAMETHASONE ACETATE 3; 3 MG/ML; MG/ML
INJECTION, SUSPENSION INTRA-ARTICULAR; INTRALESIONAL; INTRAMUSCULAR; SOFT TISSUE
Status: COMPLETED
Start: 2020-07-24 | End: 2020-07-24

## 2020-07-24 RX ORDER — LABETALOL HYDROCHLORIDE 5 MG/ML
40 INJECTION, SOLUTION INTRAVENOUS EVERY 10 MIN PRN
Status: DISCONTINUED | OUTPATIENT
Start: 2020-07-24 | End: 2020-08-06

## 2020-07-24 RX ORDER — ONDANSETRON 2 MG/ML
4 INJECTION INTRAMUSCULAR; INTRAVENOUS EVERY 6 HOURS PRN
Status: DISCONTINUED | OUTPATIENT
Start: 2020-07-24 | End: 2020-08-06

## 2020-07-24 RX ORDER — NIFEDIPINE 10 MG/1
10 CAPSULE ORAL ONCE
Status: COMPLETED | OUTPATIENT
Start: 2020-07-24 | End: 2020-07-24

## 2020-07-24 RX ORDER — LORAZEPAM 2 MG/ML
2 INJECTION INTRAMUSCULAR
Status: DISCONTINUED | OUTPATIENT
Start: 2020-07-24 | End: 2020-08-11 | Stop reason: HOSPADM

## 2020-07-24 RX ORDER — AMOXICILLIN 250 MG
2 CAPSULE ORAL 2 TIMES DAILY
Status: DISCONTINUED | OUTPATIENT
Start: 2020-07-24 | End: 2020-08-06

## 2020-07-24 RX ORDER — MAGNESIUM SULFATE HEPTAHYDRATE 500 MG/ML
4 INJECTION, SOLUTION INTRAMUSCULAR; INTRAVENOUS
Status: DISCONTINUED | OUTPATIENT
Start: 2020-07-24 | End: 2020-08-11 | Stop reason: HOSPADM

## 2020-07-24 RX ORDER — MAGNESIUM SULFATE HEPTAHYDRATE 40 MG/ML
INJECTION, SOLUTION INTRAVENOUS
Status: COMPLETED
Start: 2020-07-24 | End: 2020-07-24

## 2020-07-24 RX ORDER — PRENATAL VIT/IRON FUM/FOLIC AC 27MG-0.8MG
1 TABLET ORAL DAILY
Status: DISCONTINUED | OUTPATIENT
Start: 2020-07-25 | End: 2020-08-11 | Stop reason: HOSPADM

## 2020-07-24 RX ORDER — MAGNESIUM SULFATE IN WATER 40 MG/ML
1.5 INJECTION, SOLUTION INTRAVENOUS CONTINUOUS
Status: DISCONTINUED | OUTPATIENT
Start: 2020-07-24 | End: 2020-08-11 | Stop reason: HOSPADM

## 2020-07-24 RX ORDER — LABETALOL HYDROCHLORIDE 5 MG/ML
80 INJECTION, SOLUTION INTRAVENOUS EVERY 10 MIN PRN
Status: DISCONTINUED | OUTPATIENT
Start: 2020-07-24 | End: 2020-08-06

## 2020-07-24 RX ORDER — CALCIUM GLUCONATE 94 MG/ML
1 INJECTION, SOLUTION INTRAVENOUS
Status: DISCONTINUED | OUTPATIENT
Start: 2020-07-24 | End: 2020-08-11 | Stop reason: HOSPADM

## 2020-07-24 RX ORDER — MAGNESIUM SULFATE HEPTAHYDRATE 40 MG/ML
INJECTION, SOLUTION INTRAVENOUS
Status: DISCONTINUED
Start: 2020-07-24 | End: 2020-07-24 | Stop reason: WASHOUT

## 2020-07-24 RX ADMIN — DOXYLAMINE SUCCINATE 25 MG: 25 TABLET ORAL at 23:23

## 2020-07-24 RX ADMIN — MAGNESIUM SULFATE IN WATER 2 G/HR: 40 INJECTION, SOLUTION INTRAVENOUS at 18:46

## 2020-07-24 RX ADMIN — BETAMETHASONE SODIUM PHOSPHATE AND BETAMETHASONE ACETATE 12 MG: 3; 3 INJECTION, SUSPENSION INTRA-ARTICULAR; INTRALESIONAL; INTRAMUSCULAR; SOFT TISSUE at 18:23

## 2020-07-24 RX ADMIN — ACETAMINOPHEN 650 MG: 325 TABLET, FILM COATED ORAL at 22:24

## 2020-07-24 RX ADMIN — NIFEDIPINE 10 MG: 10 CAPSULE ORAL at 17:33

## 2020-07-24 RX ADMIN — LABETALOL HYDROCHLORIDE 20 MG: 5 INJECTION, SOLUTION INTRAVENOUS at 18:09

## 2020-07-24 RX ADMIN — MAGNESIUM SULFATE IN WATER 4 G: 40 INJECTION, SOLUTION INTRAVENOUS at 18:16

## 2020-07-24 RX ADMIN — DOCUSATE SODIUM AND SENNOSIDES 1 TABLET: 8.6; 5 TABLET, FILM COATED ORAL at 22:45

## 2020-07-24 RX ADMIN — LABETALOL HYDROCHLORIDE 40 MG: 5 INJECTION, SOLUTION INTRAVENOUS at 18:40

## 2020-07-24 RX ADMIN — MAGNESIUM SULFATE HEPTAHYDRATE 4 G: 40 INJECTION, SOLUTION INTRAVENOUS at 18:16

## 2020-07-24 RX ADMIN — SODIUM CHLORIDE, POTASSIUM CHLORIDE, SODIUM LACTATE AND CALCIUM CHLORIDE 75 ML/HR: 600; 310; 30; 20 INJECTION, SOLUTION INTRAVENOUS at 18:00

## 2020-07-24 ASSESSMENT — MIFFLIN-ST. JEOR: SCORE: 1616.72

## 2020-07-24 NOTE — PLAN OF CARE
Primip 29+5 weeks presents to [facility], sent here from clinic for pre-eclampsia eval. Pt had one slightly elevated BP at clinic earlier this week and had lab work drawn which was normal and has been taking her BPs at home.  Today she found her BP to be in the 150s/90s at home and called the clinic who sent her in to be evaluated. Pt has some mild edema in her ankles and feet which she states she's had for about a month now. Denies any HA, visual disturbances, or epigastric pain. Monitor consent obtained and applied. Serial BPs started. Discussed POC with pt and  who state understanding.

## 2020-07-24 NOTE — PLAN OF CARE
Ritu Harrington [facility] paged and updated at 1705 regarding elevated BPs, fetal heart tones, and awaiting the rest of the lab results. She will call Dr. Horne and consult and Dr. Horne will call with plan. Discussed POC with pt and  who state understanding.

## 2020-07-24 NOTE — H&P
OB History and Physical      Conchita Boss MRN# [id number]   Age: 29 year old YOB: 1990     CC:  Elevated BPs    HPI:  Ms. Conchita Boss is a 29 year old  at 29w5d by early US, who presents with elevated blood pressures.  Had been checking blood pressures at home and noted BPs of 150/100s.  Had visit  with persistent headache, preE labs at the time were normal and BPs normal. HA has since resolved.  She denies any vision changes, HA, RUQ pain, new swelling, chest pain or shortness of breath.  She denies any contractions, vaginal bleeding, and loss of fluid.   + normal fetal movement.    Pregnancy Complications:  1.  Small bowel obstruction in early pregnancy, s/p Laparoscopic LUCIANA    Prenatal Labs:   Lab Results   Component Value Date    HGB 11.7 2020       GBS Status:   No results found for: GBS    Ultrasounds  2020: 20 week anatomy, placenta anterior, male      OB History  OB History    Para Term  AB Living   1 0 0 0 0 0   SAB TAB Ectopic Multiple Live Births   0 0 0 0 0      # Outcome Date GA Lbr Carson/2nd Weight Sex Delivery Anes PTL Lv   1 Current                PMHx:   Past Medical History:   Diagnosis Date     Anxiety      Flushing      Proteinuria 2015     Thyroid nodule 2016     PSHx:   Past Surgical History:   Procedure Laterality Date     LAPAROSCOPIC LYSIS ADHESIONS N/A 2020    Procedure: LYSIS, ADHESIONS, LAPAROSCOPIC;  Surgeon: Colton Hernandes MD;  Location:  OR     OTHER SURGICAL HISTORY      partial resection of small bowel as an infant due to obstruction     Meds:   Medications Prior to Admission   Medication Sig Dispense Refill Last Dose     Prenatal Vit-Fe Fumarate-FA (PRENATAL VITAMIN) 27-0.8 MG PO TABS Take 1 tablet by mouth daily   2020 at Unknown time     acetaminophen (TYLENOL) 325 MG tablet Take 2 tablets (650 mg) by mouth every 4 hours as needed for pain        oxyCODONE (ROXICODONE) 5 MG tablet Take 1 tablet (5 mg)  "by mouth every 3 hours as needed for moderate to severe pain 5 tablet 0      simethicone (MYLICON) 80 MG chewable tablet Take 1 tablet (80 mg) by mouth every 6 hours as needed for cramping 20 tablet 0      Allergies:    Allergies   Allergen Reactions     Ceclor [Cefaclor] Hives      FmHx:   Family History   Problem Relation Age of Onset     Hypertension Father      Depression Father      Hypertension Brother      Diabetes Brother         DM1     Anxiety Disorder Mother         Also many members of family     Depression Brother      Other - See Comments Other         3 skin cancers (grandpa, dad and brother) of unknown type     Breast Cancer Paternal Aunt 52     SocHx: She denies any tobacco, alcohol, or other drug use during this pregnancy.    ROS:   Complete 10-point ROS negative except as noted in HPI    PE:  Vit:   Patient Vitals for the past 4 hrs:   BP Height Weight   20 1700 (!) 156/98 -- --   20 1655 (!) 158/98 -- --   20 1646 -- 1.778 m (5' 10\") 81.6 kg (180 lb)      Gen: Well-appearing, NAD, comfortable   CV: rrr, no mrg   Pulm: Ctab, no wheezes or crackles  Abd: Soft, gravid, non-tender,  Ext: scant LE edema b/l  Neuro: 1+ clonus bilaterally    Pres:  Vertex by BSUS  Memb: intact    FHT: Baseline 140, moderate variability, + accelerations, no decelerations   [place]: no contractions in 10 minutes      Assessment  Ms. Conchita Boss is a 29 year old , at 29w5d by early US, who presents for evaluation for elevated blood pressures, diagnosis of Severe gestational hypertension by sustained severe range blood pressures.    Plan    Severe Gestational hypertension:  - BPs sustained severe range. S/p PO nifedipine 10 mg x1, IV labetalol 20 mg x1  - Treat sustained severe range BPs with IV antihypertensives  - Start IV magnesium 4g load, followed by 1.5 mg/hr (modified due to Cr 0.93)   - Mag level in 6 hours  - Preeclampsia labs WNL (Cr high normal for pregnancy)   - Plan to repeat labs at 2200 " and in AM  - Discussed inpatient hospitalization until delivery at 34 weeks.  Discussed reasons to deliver earlier including uncontrollable severe range BPs, significant lab abnormalities, seizure, fetal status.    FWB:  - Currently category I, reassuring  - Vertex by BSUS today  - will continue continuous monitoring while on Mag  - BMZ for fetal lung maturity, repeat in 24 hours  - NICU consult  - Will plan for growth US once stable (likely Monday)  - Discussed that we deliver to 30 weeks at [facility], plan to first stabilize patient but did discuss with MFM Dr. Brandon Hayes possible reasons to transfer would include maxing out on antihypertensive agent and needing to move towards delivery before 30 weeks.    Prenatal:  - GBS unknown, will collect  - Will collect COVID testing  - Can give Tdap while in house    Michelle Horne MD  6:27 PM 7/24/2020

## 2020-07-25 LAB
ALT SERPL W P-5'-P-CCNC: 35 U/L (ref 0–50)
ALT SERPL W P-5'-P-CCNC: 38 U/L (ref 0–50)
AST SERPL W P-5'-P-CCNC: 24 U/L (ref 0–45)
AST SERPL W P-5'-P-CCNC: 28 U/L (ref 0–45)
COLLECT DURATION TIME UR: 24 H
CREAT 24H UR-MRATE: 1.01 G/(24.H) (ref 0.8–1.8)
CREAT SERPL-MCNC: 0.91 MG/DL (ref 0.52–1.04)
CREAT SERPL-MCNC: 0.95 MG/DL (ref 0.52–1.04)
CREAT UR-MCNC: 21 MG/DL
ERYTHROCYTE [DISTWIDTH] IN BLOOD BY AUTOMATED COUNT: 13.8 % (ref 10–15)
ERYTHROCYTE [DISTWIDTH] IN BLOOD BY AUTOMATED COUNT: 13.9 % (ref 10–15)
GFR SERPL CREATININE-BSD FRML MDRD: 80 ML/MIN/{1.73_M2}
GFR SERPL CREATININE-BSD FRML MDRD: 84 ML/MIN/{1.73_M2}
GP B STREP DNA SPEC QL NAA+PROBE: NEGATIVE
HCT VFR BLD AUTO: 34.6 % (ref 35–47)
HCT VFR BLD AUTO: 38.3 % (ref 35–47)
HGB BLD-MCNC: 11.5 G/DL (ref 11.7–15.7)
HGB BLD-MCNC: 12.5 G/DL (ref 11.7–15.7)
MAGNESIUM SERPL-MCNC: 4.6 MG/DL (ref 1.6–2.3)
MCH RBC QN AUTO: 30.1 PG (ref 26.5–33)
MCH RBC QN AUTO: 30.7 PG (ref 26.5–33)
MCHC RBC AUTO-ENTMCNC: 32.6 G/DL (ref 31.5–36.5)
MCHC RBC AUTO-ENTMCNC: 33.2 G/DL (ref 31.5–36.5)
MCV RBC AUTO: 92 FL (ref 78–100)
MCV RBC AUTO: 93 FL (ref 78–100)
PLATELET # BLD AUTO: 179 10E9/L (ref 150–450)
PLATELET # BLD AUTO: 181 10E9/L (ref 150–450)
PROT 24H UR-MRATE: <0.24 G/(24.H) (ref 0.04–0.23)
PROT UR-MCNC: <0.05 G/L
PROT/CREAT 24H UR: ABNORMAL G/G CR (ref 0–0.2)
RBC # BLD AUTO: 3.74 10E12/L (ref 3.8–5.2)
RBC # BLD AUTO: 4.15 10E12/L (ref 3.8–5.2)
SPECIMEN SOURCE: NORMAL
SPECIMEN VOL UR: 4825 ML
WBC # BLD AUTO: 10.3 10E9/L (ref 4–11)
WBC # BLD AUTO: 11.8 10E9/L (ref 4–11)

## 2020-07-25 PROCEDURE — 84450 TRANSFERASE (AST) (SGOT): CPT | Performed by: OBSTETRICS & GYNECOLOGY

## 2020-07-25 PROCEDURE — 25000132 ZZH RX MED GY IP 250 OP 250 PS 637: Performed by: OBSTETRICS & GYNECOLOGY

## 2020-07-25 PROCEDURE — 99232 SBSQ HOSP IP/OBS MODERATE 35: CPT | Performed by: NURSE PRACTITIONER

## 2020-07-25 PROCEDURE — 81050 URINALYSIS VOLUME MEASURE: CPT | Performed by: OBSTETRICS & GYNECOLOGY

## 2020-07-25 PROCEDURE — 82565 ASSAY OF CREATININE: CPT | Performed by: OBSTETRICS & GYNECOLOGY

## 2020-07-25 PROCEDURE — 25800030 ZZH RX IP 258 OP 636: Performed by: OBSTETRICS & GYNECOLOGY

## 2020-07-25 PROCEDURE — 12000000 ZZH R&B MED SURG/OB

## 2020-07-25 PROCEDURE — 84156 ASSAY OF PROTEIN URINE: CPT | Performed by: OBSTETRICS & GYNECOLOGY

## 2020-07-25 PROCEDURE — 83735 ASSAY OF MAGNESIUM: CPT | Performed by: OBSTETRICS & GYNECOLOGY

## 2020-07-25 PROCEDURE — 36415 COLL VENOUS BLD VENIPUNCTURE: CPT | Performed by: OBSTETRICS & GYNECOLOGY

## 2020-07-25 PROCEDURE — 84460 ALANINE AMINO (ALT) (SGPT): CPT | Performed by: OBSTETRICS & GYNECOLOGY

## 2020-07-25 PROCEDURE — 85027 COMPLETE CBC AUTOMATED: CPT | Performed by: OBSTETRICS & GYNECOLOGY

## 2020-07-25 PROCEDURE — 25000128 H RX IP 250 OP 636: Performed by: OBSTETRICS & GYNECOLOGY

## 2020-07-25 PROCEDURE — 90715 TDAP VACCINE 7 YRS/> IM: CPT | Performed by: OBSTETRICS & GYNECOLOGY

## 2020-07-25 RX ORDER — MAGNESIUM SULFATE HEPTAHYDRATE 40 MG/ML
2 INJECTION, SOLUTION INTRAVENOUS
Status: DISCONTINUED | OUTPATIENT
Start: 2020-07-25 | End: 2020-08-11 | Stop reason: HOSPADM

## 2020-07-25 RX ADMIN — SODIUM CHLORIDE, POTASSIUM CHLORIDE, SODIUM LACTATE AND CALCIUM CHLORIDE 75 ML/HR: 600; 310; 30; 20 INJECTION, SOLUTION INTRAVENOUS at 15:49

## 2020-07-25 RX ADMIN — DOXYLAMINE SUCCINATE 25 MG: 25 TABLET ORAL at 22:50

## 2020-07-25 RX ADMIN — DOCUSATE SODIUM AND SENNOSIDES 1 TABLET: 8.6; 5 TABLET, FILM COATED ORAL at 10:06

## 2020-07-25 RX ADMIN — MAGNESIUM SULFATE IN WATER 2 G/HR: 40 INJECTION, SOLUTION INTRAVENOUS at 17:54

## 2020-07-25 RX ADMIN — SODIUM CHLORIDE, POTASSIUM CHLORIDE, SODIUM LACTATE AND CALCIUM CHLORIDE 87.5 ML/HR: 600; 310; 30; 20 INJECTION, SOLUTION INTRAVENOUS at 04:02

## 2020-07-25 RX ADMIN — BETAMETHASONE SODIUM PHOSPHATE AND BETAMETHASONE ACETATE 12 MG: 3; 3 INJECTION, SUSPENSION INTRA-ARTICULAR; INTRALESIONAL; INTRAMUSCULAR; SOFT TISSUE at 18:31

## 2020-07-25 RX ADMIN — MAGNESIUM SULFATE IN WATER 2 G/HR: 40 INJECTION, SOLUTION INTRAVENOUS at 07:48

## 2020-07-25 RX ADMIN — CLOSTRIDIUM TETANI TOXOID ANTIGEN (FORMALDEHYDE INACTIVATED), CORYNEBACTERIUM DIPHTHERIAE TOXOID ANTIGEN (FORMALDEHYDE INACTIVATED), BORDETELLA PERTUSSIS TOXOID ANTIGEN (GLUTARALDEHYDE INACTIVATED), BORDETELLA PERTUSSIS FILAMENTOUS HEMAGGLUTININ ANTIGEN (FORMALDEHYDE INACTIVATED), BORDETELLA PERTUSSIS PERTACTIN ANTIGEN, AND BORDETELLA PERTUSSIS FIMBRIAE 2/3 ANTIGEN 0.5 ML: 5; 2; 2.5; 5; 3; 5 INJECTION, SUSPENSION INTRAMUSCULAR at 11:22

## 2020-07-25 RX ADMIN — ACETAMINOPHEN 650 MG: 325 TABLET, FILM COATED ORAL at 18:39

## 2020-07-25 RX ADMIN — ACETAMINOPHEN 650 MG: 325 TABLET, FILM COATED ORAL at 11:43

## 2020-07-25 RX ADMIN — DOCUSATE SODIUM AND SENNOSIDES 2 TABLET: 8.6; 5 TABLET, FILM COATED ORAL at 22:49

## 2020-07-25 RX ADMIN — Medication 1 LOZENGE: at 06:45

## 2020-07-25 RX ADMIN — PRENATAL VITAMINS-IRON FUMARATE 27 MG IRON-FOLIC ACID 0.8 MG TABLET 1 TABLET: at 10:06

## 2020-07-25 ASSESSMENT — MIFFLIN-ST. JEOR: SCORE: 1627.61

## 2020-07-25 NOTE — PLAN OF CARE
After obtaining verbal consent from patient, nasopharyngeal swab for COVID-19 test performed. Specimen labeled with patient label and hand delivered to laboratory.

## 2020-07-25 NOTE — PLAN OF CARE
Dr. Horne in department at 1900, reviewed strip and BPs, orders received. Discussed POC with pt and .

## 2020-07-25 NOTE — PLAN OF CARE
Pt is  at 29w6d presenting with gestational hypertension. Blood type AB+. Hx of thyroid nodules and small bowel obstruction at 7 weeks. Hep B neg, Rubella immune. GBS results pending. Magnesium sulfate infusing at 1.5 g/hour. LR infusing at 87.5 ml/hr. Clear liquids with toast/crackers diet. Stand-by assist to bathroom. Strict I&Os, collecting 24 hour urine sample. Magnesium level 4.6. COVID negative. Lung sounds clear. Complaint of slight headache, resolved with Tylenol. Edema +1 in feet and ankles. Denies dizziness, epigastric pain, vision changes. Reflexes WNL, no clonus present. Pt rested well overnight.

## 2020-07-25 NOTE — PLAN OF CARE
Dr. Horne at bedside at 1750, reviewing strip and BPs, discussing POC with pt and , orders received. Labetalol given and Magnesium Sulfate bolus initiated.

## 2020-07-25 NOTE — PLAN OF CARE
Dr. Horne paged and 1725 and updated re: BPs in severe range and orders received.  She is on her way to evaluate patient. Discussed POC with pt and . Nifedipine given and IV started.

## 2020-07-25 NOTE — PLAN OF CARE
Dr. Horne remains at bedside at 1820 answering patients questions and monitoring BPs. US done by Dr. Horne and vertex presentation confirmed.

## 2020-07-25 NOTE — PROGRESS NOTES
"OB Antepartum Note - Hospital Day 2    S:  Patient is doing well. Feeling mild HA at base of skull - 4/10. This is the same headache she had yesterday morning that resolves with Tylenol. Denies blurry vision or RUQ pain.  No contractions. No vaginal bleeding. Baby active. No LOF. Bowels moving. Tolerating diet.      O:  BP (!) 142/83   Temp 99  F (37.2  C) (Temporal)   Resp 16   Ht 1.778 m (5' 10\")   Wt 81.6 kg (180 lb)   LMP 2019   SpO2 97%   BMI 25.83 kg/m       UOP - 1,500 overnight; 500 since 0700    Gen-A&O, NAD  Abd- Gravid, non-tender  EFM-  Baseline 120, accels 10x10 present, moderate variability, no decelerations  [place]- none    Labs:   Lab Results   Component Value Date    WBC 10.3 2020    WBC 9.8 2020    WBC 9.1 2020    HGB 12.5 2020    HGB 12.7 2020    HGB 11.7 2020    HCT 38.3 2020    HCT 37.9 2020    HCT 34.7 (L) 2020    MCV 92 2020    MCV 92 2020    MCV 91 2020     2020     2020     2020     Lab Results   Component Value Date    CR 0.91 2020    CR 0.90 2020    CR 0.93 2020     Lab Results   Component Value Date    AST 28 2020    AST 27 2020    AST 32 2020    ALT 38 2020    ALT 36 2020    ALT 35 2020    ALKPHOS 59 2020    ALKPHOS 106 2015    ALKPHOS 72 2015    BILITOTAL 0.3 2020    BILITOTAL 0.5 2015    BILITOTAL 0.3 2015      Urine P/C ratio too low to calculate  24 hour urine protein being collected    COVID PCR neg    Bedside US on admission: vertex.    A/P: 29 year old  @ 29w6d HD # 2 admitted with severely elevated blood pressures.    1. Severe gestational hypertension - bp improved after one dose of 10 mg oral Procardia and IV labetalol 20 & 40 mg, now on magnesium sulfate (1.5 grams then increased to 2g as UOP excellent and Mag level 4.6). Labs normal thus far, recheck 8 hours, " await 24 hour urine results.  Mild headache is notable as patient does not have these at baseline. If not resolving with Tylenol, will need to be notified. Continue magnesium sulfate for seizure prophylaxis. Delivery at 34 weeks, sooner for uncontrolled bp, uncontrolled HA, lab elevations meeting criteria.  2.  gestation - BMZ in process. Second dose due at 1815 today. Continue magnesium through BMZ window. Continuous monitoring while on magnesium sulfate. GBS pending. NICU consult pending. Plan [facility] US on Monday.  3. FEN/GI - regular diet as tolerated  4. Strict I/O  5. Bedrest with bathroom privileges  6. SCDs.  7. Tdap due today.    Mary Pettit MD  2020  9:45 AM

## 2020-07-25 NOTE — CONSULTS
_       [facility]                      Neonatology Advanced Practice Antepartum Counseling Consult      I was asked to provide antepartum counseling for Conchita Boss at the request of Michelle Horne MD secondary to . Ms. Boss is currently 29 and 6/7 weeks and has a hx significant for severe gestational hypertension receiving anti -hypertensive therapy. Initial Betamethasone was administered on  and will receive dose#2 today. Ms. Boss, accompanied by her , was counseled on the expected hospital course, potential risks, and outcomes associated with an infant born at approximately 30 weeks gestation. The counseling included: morbidity, mortality, initial delivery room stabilization, respiratory course, lung development, RDS, hypothermia, hypoglycemia, hyperbilirubinemia, hemodynamic support, infection,  nutrition, growth and development, and long term outcomes. Please feel free to call with any additional questions or concerns.      MINESH Cherry, CNP 2020  1130AM   Advanced Practice Service    Intensive Care Unit    Floor Time (min): 5  Face to Face Time (min): 20  Total Time (minutes): 25  More than 50% of my time was spent in direct, face to face, antepartum counseling with the above patient.

## 2020-07-26 LAB
ALT SERPL W P-5'-P-CCNC: 33 U/L (ref 0–50)
ALT SERPL W P-5'-P-CCNC: 36 U/L (ref 0–50)
AST SERPL W P-5'-P-CCNC: 21 U/L (ref 0–45)
AST SERPL W P-5'-P-CCNC: 22 U/L (ref 0–45)
CREAT SERPL-MCNC: 0.77 MG/DL (ref 0.52–1.04)
CREAT SERPL-MCNC: 0.84 MG/DL (ref 0.52–1.04)
ERYTHROCYTE [DISTWIDTH] IN BLOOD BY AUTOMATED COUNT: 13.9 % (ref 10–15)
ERYTHROCYTE [DISTWIDTH] IN BLOOD BY AUTOMATED COUNT: 13.9 % (ref 10–15)
GFR SERPL CREATININE-BSD FRML MDRD: >90 ML/MIN/{1.73_M2}
GFR SERPL CREATININE-BSD FRML MDRD: >90 ML/MIN/{1.73_M2}
HCT VFR BLD AUTO: 33.6 % (ref 35–47)
HCT VFR BLD AUTO: 35.9 % (ref 35–47)
HGB BLD-MCNC: 11.2 G/DL (ref 11.7–15.7)
HGB BLD-MCNC: 11.8 G/DL (ref 11.7–15.7)
MCH RBC QN AUTO: 30.4 PG (ref 26.5–33)
MCH RBC QN AUTO: 30.9 PG (ref 26.5–33)
MCHC RBC AUTO-ENTMCNC: 32.9 G/DL (ref 31.5–36.5)
MCHC RBC AUTO-ENTMCNC: 33.3 G/DL (ref 31.5–36.5)
MCV RBC AUTO: 93 FL (ref 78–100)
MCV RBC AUTO: 93 FL (ref 78–100)
PLATELET # BLD AUTO: 168 10E9/L (ref 150–450)
PLATELET # BLD AUTO: 177 10E9/L (ref 150–450)
RBC # BLD AUTO: 3.63 10E12/L (ref 3.8–5.2)
RBC # BLD AUTO: 3.88 10E12/L (ref 3.8–5.2)
WBC # BLD AUTO: 14.3 10E9/L (ref 4–11)
WBC # BLD AUTO: 14.3 10E9/L (ref 4–11)

## 2020-07-26 PROCEDURE — 25000132 ZZH RX MED GY IP 250 OP 250 PS 637: Performed by: OBSTETRICS & GYNECOLOGY

## 2020-07-26 PROCEDURE — 84450 TRANSFERASE (AST) (SGOT): CPT | Performed by: OBSTETRICS & GYNECOLOGY

## 2020-07-26 PROCEDURE — 85027 COMPLETE CBC AUTOMATED: CPT | Performed by: OBSTETRICS & GYNECOLOGY

## 2020-07-26 PROCEDURE — 25000128 H RX IP 250 OP 636: Performed by: OBSTETRICS & GYNECOLOGY

## 2020-07-26 PROCEDURE — 82565 ASSAY OF CREATININE: CPT | Performed by: OBSTETRICS & GYNECOLOGY

## 2020-07-26 PROCEDURE — 36415 COLL VENOUS BLD VENIPUNCTURE: CPT | Performed by: OBSTETRICS & GYNECOLOGY

## 2020-07-26 PROCEDURE — 84460 ALANINE AMINO (ALT) (SGPT): CPT | Performed by: OBSTETRICS & GYNECOLOGY

## 2020-07-26 PROCEDURE — 25800030 ZZH RX IP 258 OP 636: Performed by: OBSTETRICS & GYNECOLOGY

## 2020-07-26 PROCEDURE — 12000000 ZZH R&B MED SURG/OB

## 2020-07-26 RX ORDER — BISACODYL 10 MG
10 SUPPOSITORY, RECTAL RECTAL DAILY PRN
Status: DISCONTINUED | OUTPATIENT
Start: 2020-07-26 | End: 2020-08-11 | Stop reason: HOSPADM

## 2020-07-26 RX ORDER — LABETALOL 200 MG/1
200 TABLET, FILM COATED ORAL EVERY 8 HOURS SCHEDULED
Status: DISCONTINUED | OUTPATIENT
Start: 2020-07-26 | End: 2020-07-28

## 2020-07-26 RX ADMIN — PRENATAL VITAMINS-IRON FUMARATE 27 MG IRON-FOLIC ACID 0.8 MG TABLET 1 TABLET: at 10:17

## 2020-07-26 RX ADMIN — SODIUM CHLORIDE, POTASSIUM CHLORIDE, SODIUM LACTATE AND CALCIUM CHLORIDE 75 ML/HR: 600; 310; 30; 20 INJECTION, SOLUTION INTRAVENOUS at 03:55

## 2020-07-26 RX ADMIN — MAGNESIUM SULFATE IN WATER 2 G/HR: 40 INJECTION, SOLUTION INTRAVENOUS at 03:55

## 2020-07-26 RX ADMIN — ACETAMINOPHEN 650 MG: 325 TABLET, FILM COATED ORAL at 09:17

## 2020-07-26 RX ADMIN — ACETAMINOPHEN 650 MG: 325 TABLET, FILM COATED ORAL at 21:55

## 2020-07-26 RX ADMIN — MAGNESIUM SULFATE IN WATER 2 G/HR: 40 INJECTION, SOLUTION INTRAVENOUS at 13:26

## 2020-07-26 RX ADMIN — LABETALOL HYDROCHLORIDE 200 MG: 200 TABLET, FILM COATED ORAL at 16:50

## 2020-07-26 RX ADMIN — ACETAMINOPHEN 650 MG: 325 TABLET, FILM COATED ORAL at 16:11

## 2020-07-26 ASSESSMENT — MIFFLIN-ST. JEOR: SCORE: 1633.73

## 2020-07-26 NOTE — PROGRESS NOTES
"OB Antepartum Note - Hospital Day 3    S:  Patient is doing fine. Slept on and off last night. Headache present this morning, behind right eye. Has not tried Tylenol yet. No blurry vision or RUQ pain. No contractions.  No vaginal bleeding.  Baby active.  No LOF.  Bowels moving.  Tolerating diet.      O:  /83   Temp 99.5  F (37.5  C) (Temporal)   Resp 16   Ht 1.778 m (5' 10\")   Wt 83.3 kg (183 lb 12 oz)   LMP 2019   SpO2 96%   BMI 26.37 kg/m    Gen-A&O, NAD  Abd- Gravid, non-tender  EFM-  Baseline 25, accels prsent, moderate variability, no decelerations  [place]- ctx quiet  [place]erv[place]-  Deferred      Labs:   Lab Results   Component Value Date    WBC 14.3 (H) 2020    WBC 11.8 (H) 2020    WBC 10.3 2020    HGB 11.8 2020    HGB 11.5 (L) 2020    HGB 12.5 2020    HCT 35.9 2020    HCT 34.6 (L) 2020    HCT 38.3 2020    MCV 93 2020    MCV 93 2020    MCV 92 2020     2020     2020     2020     Lab Results   Component Value Date    CR 0.77 2020    CR 0.95 2020    CR 0.91 2020     Lab Results   Component Value Date    AST 21 2020    AST 24 2020    AST 28 2020    ALT 33 2020    ALT 35 2020    ALT 38 2020    ALKPHOS 59 2020    ALKPHOS 106 2015    ALKPHOS 72 2015    BILITOTAL 0.3 2020    BILITOTAL 0.5 2015    BILITOTAL 0.3 2015     24 hour urine: 240 mg total protein    Bedside US on admission - vtx.    A/P: 29 year old  @ 30w0d HD # 3 admitted for severe gestational hypertension.     1. Severe gestational hypertension - bp mostly mild range on magnesium sulfate. Initially required improved after one dose of 10 mg oral Procardia and IV labetalol 20 & 40 mg upon arrival on .  Labs normal thus far, 24 hour urine protein <300 mg.  Mild headache is notable as patient does not have these at baseline, but they continue " to resolve with Tylenol. Continue magnesium sulfate for seizure prophylaxis through BMZ window. Will start labetalol 200 mg TID anticipating increase in blood pressures with magnesium discontinuation this evening. Delivery at 34 weeks, sooner for uncontrolled bp, uncontrolled HA, lab elevations meeting criteria.  2.  gestation - BMZ window will be complete [id number] today. Continue magnesium through BMZ window. GBS negative. NICU consult completed. Plan [facility] US on Monday.  3. FEN/GI - regular diet as tolerated  4. Strict I/O  5. Bedrest with bathroom privileges  6. SCDs.  7. All questions answered in detail. Patient expresses understanding and agreement.    Mary Pettit MD  2020  8:53 AM

## 2020-07-26 NOTE — PLAN OF CARE
Primip at 30 weeks today with gestational hypertension, labs monitored per MD order, BP [id number], NST TID, Mag sulfate till 1815 today. Voiding in good amts. BPs see flowsheet. Left leg reflexes slightly brisk, headache this AM resolved with tylenol and a cup of coffee. Both doses of Beta are in and patient to start on po meds for BP at 1700. Patient in agreement with MD plan of care. Continue to monitor.

## 2020-07-27 ENCOUNTER — HOSPITAL ENCOUNTER (INPATIENT)
Dept: ULTRASOUND IMAGING | Facility: CLINIC | Age: 30
End: 2020-07-27
Attending: OBSTETRICS & GYNECOLOGY
Payer: OTHER GOVERNMENT

## 2020-07-27 PROCEDURE — 76819 FETAL BIOPHYS PROFIL W/O NST: CPT | Performed by: OBSTETRICS & GYNECOLOGY

## 2020-07-27 PROCEDURE — 76811 OB US DETAILED SNGL FETUS: CPT

## 2020-07-27 PROCEDURE — 25000132 ZZH RX MED GY IP 250 OP 250 PS 637: Performed by: OBSTETRICS & GYNECOLOGY

## 2020-07-27 PROCEDURE — 12000000 ZZH R&B MED SURG/OB

## 2020-07-27 RX ADMIN — DOCUSATE SODIUM AND SENNOSIDES 1 TABLET: 8.6; 5 TABLET, FILM COATED ORAL at 23:40

## 2020-07-27 RX ADMIN — LABETALOL HYDROCHLORIDE 200 MG: 200 TABLET, FILM COATED ORAL at 07:53

## 2020-07-27 RX ADMIN — DOXYLAMINE SUCCINATE 25 MG: 25 TABLET ORAL at 00:04

## 2020-07-27 RX ADMIN — LABETALOL HYDROCHLORIDE 200 MG: 200 TABLET, FILM COATED ORAL at 23:41

## 2020-07-27 RX ADMIN — PRENATAL VITAMINS-IRON FUMARATE 27 MG IRON-FOLIC ACID 0.8 MG TABLET 1 TABLET: at 12:06

## 2020-07-27 RX ADMIN — DOCUSATE SODIUM AND SENNOSIDES 1 TABLET: 8.6; 5 TABLET, FILM COATED ORAL at 12:06

## 2020-07-27 RX ADMIN — LABETALOL HYDROCHLORIDE 200 MG: 200 TABLET, FILM COATED ORAL at 16:33

## 2020-07-27 RX ADMIN — DOXYLAMINE SUCCINATE 25 MG: 25 TABLET ORAL at 23:41

## 2020-07-27 RX ADMIN — LABETALOL HYDROCHLORIDE 200 MG: 200 TABLET, FILM COATED ORAL at 00:04

## 2020-07-27 ASSESSMENT — MIFFLIN-ST. JEOR: SCORE: 1639.4

## 2020-07-27 NOTE — PROGRESS NOTES
"OB Antepartum Note - Hospital Day 3    S:  Patient is doing well. BURGER overnight (didn't take anything) and no gone. Off Mg++ for about 12 hours. . Denies blurry vision or RUQ pain.  No contractions. No vaginal bleeding. Baby active. No LOF. Bowels moving. Tolerating diet.      O:  BP (!) 149/88   Temp 99.4  F (37.4  C) (Temporal)   Resp 16   Ht 1.778 m (5' 10\")   Wt 83.9 kg (185 lb)   LMP 2019   SpO2 96%   BMI 26.54 kg/m           Gen-A&O, NAD  Abd- Gravid, non-tender  EFM-  Baseline 120, accels 10x10 present, moderate variability, no decelerations  [place]- none    Labs:   Lab Results   Component Value Date    WBC 14.3 (H) 2020    WBC 14.3 (H) 2020    WBC 11.8 (H) 2020    HGB 11.2 (L) 2020    HGB 11.8 2020    HGB 11.5 (L) 2020    HCT 33.6 (L) 2020    HCT 35.9 2020    HCT 34.6 (L) 2020    MCV 93 2020    MCV 93 2020    MCV 93 2020     2020     2020     2020     Lab Results   Component Value Date    CR 0.84 2020    CR 0.77 2020    CR 0.95 2020     Lab Results   Component Value Date    AST 22 2020    AST 21 2020    AST 24 2020    ALT 36 2020    ALT 33 2020    ALT 35 2020    ALKPHOS 59 2020    ALKPHOS 106 2015    ALKPHOS 72 2015    BILITOTAL 0.3 2020    BILITOTAL 0.5 2015    BILITOTAL 0.3 2015      Urine P/C ratio too low to calculate  24 hour urine - borderline (<0.24, normal is <0.23)    Blood pressures q 4 hours on labetalol 200mg TID; highest 148/98, lowest 123/68      COVID PCR neg    Bedside US on admission: vertex.    A/P: 29 year old  @ 30+0 wks HD # 3 admitted with severe range blood pressures improved on oral medication and  normal serial PIH labs     1. Severe gestational hypertension - bp improved after one dose of 10 mg oral Procardia and IV labetalol 20 & 40 mg, now on labetalol 200 mg TID.  Mild " headache is notable as patient does not have these at baseline. These are better off Mg++ and have resolved with Tylenol. Discussed with pt that MD needs to be notified is HA does not improve with tylenol. Increase bp checks to q2hr while awake.    Consider deliver at 34 weeks, though this could change if bp remains stable on medications. Discussed delivery sooner for uncontrolled bp, uncontrolled HA, lab elevations and/or other clinical signs of severe features.     2.  gestation - BMZ completed and 48 hrs of Mg++ which covered neuroprotection. Sono for growth and MFM consult today.    3. FEN/GI - regular diet as tolerated, encouraged frequent snacks  4. Strict I/O - continue  5. Ok to ambulate with bathroom privileges  6. SCDs.  7. Pathophysiology and the continuum of PIH discussed with pt and  at length. Currently patient is without clinical indications of severe features that would necessitate delivery though this could change at any time. Disposition unclear at this time, though reassuring that status has stabllized. Discussed possibility of hospitalization v. home later this week with frequent office visits if bp remains stable on oral medication. Await MFM consult.    All questions answered

## 2020-07-27 NOTE — PLAN OF CARE
Pt states she has a continuous headache, rates it 4/10.  Declines pain medication at this time, wishes to request something a little stronger from the doctor during round this a.m.  Reflexes brisk, no clonus noted.  Denies visual changes or epigastric pain.  Pt took unisom at bedtime, was able to sleep for approximately 5-6 hours nearly undisturbed.   home to sleep, will return sometime today.  Declines needs at this time.

## 2020-07-27 NOTE — PROVIDER NOTIFICATION
"PT c/o feeling \"shaky and twitchy\". MD updated of VS and patients complaints. No new orders at this time.  "

## 2020-07-27 NOTE — PLAN OF CARE
Patient has been feeling better today, she states her headache is much better today.  MFM ultrasound done and Dr Manzano here for evaluation this afternoon.  No epigastric pain or visual changes, brisk reflexes with one beat clonus.  BP this afternoon was 160s/90s, but recheck was 140/90s so no treatment needed at this time, but will continue to monitor.  Report given to Tomasa PEREZ RN taking over cares.

## 2020-07-28 LAB
ALBUMIN SERPL-MCNC: 2.4 G/DL (ref 3.4–5)
ALP SERPL-CCNC: 101 U/L (ref 40–150)
ALT SERPL W P-5'-P-CCNC: 34 U/L (ref 0–50)
ANION GAP SERPL CALCULATED.3IONS-SCNC: 8 MMOL/L (ref 3–14)
AST SERPL W P-5'-P-CCNC: 21 U/L (ref 0–45)
BILIRUB SERPL-MCNC: 0.2 MG/DL (ref 0.2–1.3)
BUN SERPL-MCNC: 15 MG/DL (ref 7–30)
CALCIUM SERPL-MCNC: 8.8 MG/DL (ref 8.5–10.1)
CHLORIDE SERPL-SCNC: 109 MMOL/L (ref 94–109)
CO2 SERPL-SCNC: 20 MMOL/L (ref 20–32)
CREAT SERPL-MCNC: 0.87 MG/DL (ref 0.52–1.04)
ERYTHROCYTE [DISTWIDTH] IN BLOOD BY AUTOMATED COUNT: 13.9 % (ref 10–15)
GFR SERPL CREATININE-BSD FRML MDRD: 89 ML/MIN/{1.73_M2}
GLUCOSE SERPL-MCNC: 95 MG/DL (ref 70–99)
HCT VFR BLD AUTO: 35.4 % (ref 35–47)
HGB BLD-MCNC: 11.6 G/DL (ref 11.7–15.7)
MCH RBC QN AUTO: 30.8 PG (ref 26.5–33)
MCHC RBC AUTO-ENTMCNC: 32.8 G/DL (ref 31.5–36.5)
MCV RBC AUTO: 94 FL (ref 78–100)
PLATELET # BLD AUTO: 148 10E9/L (ref 150–450)
POTASSIUM SERPL-SCNC: 3.9 MMOL/L (ref 3.4–5.3)
PROT SERPL-MCNC: 5.5 G/DL (ref 6.8–8.8)
RBC # BLD AUTO: 3.77 10E12/L (ref 3.8–5.2)
SODIUM SERPL-SCNC: 137 MMOL/L (ref 133–144)
WBC # BLD AUTO: 9.8 10E9/L (ref 4–11)

## 2020-07-28 PROCEDURE — 80053 COMPREHEN METABOLIC PANEL: CPT | Performed by: OBSTETRICS & GYNECOLOGY

## 2020-07-28 PROCEDURE — 25000132 ZZH RX MED GY IP 250 OP 250 PS 637: Performed by: OBSTETRICS & GYNECOLOGY

## 2020-07-28 PROCEDURE — 85027 COMPLETE CBC AUTOMATED: CPT | Performed by: OBSTETRICS & GYNECOLOGY

## 2020-07-28 PROCEDURE — 12000000 ZZH R&B MED SURG/OB

## 2020-07-28 PROCEDURE — 36415 COLL VENOUS BLD VENIPUNCTURE: CPT | Performed by: OBSTETRICS & GYNECOLOGY

## 2020-07-28 RX ORDER — LABETALOL 100 MG/1
100 TABLET, FILM COATED ORAL ONCE
Status: COMPLETED | OUTPATIENT
Start: 2020-07-28 | End: 2020-07-28

## 2020-07-28 RX ADMIN — PRENATAL VITAMINS-IRON FUMARATE 27 MG IRON-FOLIC ACID 0.8 MG TABLET 1 TABLET: at 08:04

## 2020-07-28 RX ADMIN — LABETALOL HYDROCHLORIDE 100 MG: 100 TABLET, FILM COATED ORAL at 08:53

## 2020-07-28 RX ADMIN — DOCUSATE SODIUM AND SENNOSIDES 1 TABLET: 8.6; 5 TABLET, FILM COATED ORAL at 23:51

## 2020-07-28 RX ADMIN — LABETALOL HYDROCHLORIDE 300 MG: 200 TABLET, FILM COATED ORAL at 16:09

## 2020-07-28 RX ADMIN — LABETALOL HYDROCHLORIDE 300 MG: 200 TABLET, FILM COATED ORAL at 23:51

## 2020-07-28 RX ADMIN — LABETALOL HYDROCHLORIDE 200 MG: 200 TABLET, FILM COATED ORAL at 08:04

## 2020-07-28 RX ADMIN — DOCUSATE SODIUM AND SENNOSIDES 1 TABLET: 8.6; 5 TABLET, FILM COATED ORAL at 08:04

## 2020-07-28 RX ADMIN — DOXYLAMINE SUCCINATE 25 MG: 25 TABLET ORAL at 23:50

## 2020-07-28 NOTE — PLAN OF CARE
Pt feeling well. Reports headache has decreased in intensity but is still present as a dull headache. Denies blurry vision, SOB, epigastric pain.  /91 at 1600 today. Scheduled 300mg labetalol dose administered. Pt up to shower. Reflexes brisk, no clonus. Cont to monitor and assess.

## 2020-07-28 NOTE — PLAN OF CARE
Patient had a headache rated 1-2/10, declined pain medication.  BPs 130s-150s/80s, no PRN labetalol needed.  Reflexes and clonus were WNL.  Denies epigastric pain, or blurry vision.  Patient resting comfortably.

## 2020-07-28 NOTE — PROGRESS NOTES
OB Antepartum Note - Hospital Day 4    S:  Patient is doing well.  . Denies HA, blurry vision, CP, SOB, or RUQ pain.  No contractions. No vaginal bleeding. Baby active. No LOF. Bowels moving. Tolerating diet.      O:    Vitals:    20 1004 20 1024 20 1156 20 1212   BP: (!) 168/93 (!) 153/94 (!) 159/102 136/76   Resp:       Temp:       TempSrc:       SpO2:       Weight:       Height:         Gen-A&O, NAD  Abd- Gravid, non-tender  EFM-  Baseline 135 bpm, accels 10x10 present, moderate variability, no decelerations  [place]- none    Labs:   Lab Results   Component Value Date    WBC 9.8 2020    WBC 14.3 (H) 2020    WBC 14.3 (H) 2020    HGB 11.6 (L) 2020    HGB 11.2 (L) 2020    HGB 11.8 2020    HCT 35.4 2020    HCT 33.6 (L) 2020    HCT 35.9 2020    MCV 94 2020    MCV 93 2020    MCV 93 2020     (L) 2020     2020     2020     Lab Results   Component Value Date    CR 0.87 2020    CR 0.84 2020    CR 0.77 2020     Lab Results   Component Value Date    AST 21 2020    AST 22 2020    AST 21 2020    ALT 34 2020    ALT 36 2020    ALT 33 2020    ALKPHOS 101 2020    ALKPHOS 59 2020    ALKPHOS 106 2015    BILITOTAL 0.2 2020    BILITOTAL 0.3 2020    BILITOTAL 0.5 2015      Urine P/C ratio too low to calculate  24 hour urine - borderline (<0.24, normal is <0.23)  COVID PCR neg    Bedside US on admission: vertex.  Growth US : EFW 1552g, 53%ile    A/P: 29 year old  @ 30+0 wks HD # 3 admitted with severe range blood pressures improved on oral medication and  normal serial PIH labs     1. Severe gestational hypertension/PreE w/ SF -   - BP s/p 10 mg oral Procardia and IV labetalol 20 & 40 mg,    -  Labetalol 200 mg TID.  Will increase to 300 mg TID this AM for continued high mild range BPs  - Currently asymptomatic  but has intermittent mild headache  - PreE labs have been within normal limits. Plan to repeat every other day or PRN  - Continue daily weights, I/Os  - s/p 48 hours IV Mag for seizure ppx, restart if considering delivery  - Consider deliver at 34 weeks, though this could change if bp remains stable on medications. Discussed delivery sooner for uncontrolled bp, uncontrolled HA, lab elevations and/or other clinical signs of severe features.     2. FWB:  gestation at 30 weeks  - s/p BMZ -   - IV mag if considering delivery prior to 32 weeks for neuroprotection  - Repeat growth US q2-3 weeks (last )   - S/p NICU consult  - TID NST and twice weekly BPPs (mon/Thur) per M    3. Prenatal:  - GBS neg ()  - COVID neg ()  - s/p Tdap    4. FEN/GI - regular diet as tolerated, encouraged frequent snacks  5. Strict I/O - continue  6. Ok to ambulate with bathroom privileges, SCDs in bed    All questions answered    Michelle Horne MD  [facility]  2020

## 2020-07-29 LAB
ABO + RH BLD: NORMAL
ABO + RH BLD: NORMAL
ALBUMIN SERPL-MCNC: 2.4 G/DL (ref 3.4–5)
ALP SERPL-CCNC: 102 U/L (ref 40–150)
ALT SERPL W P-5'-P-CCNC: 34 U/L (ref 0–50)
ANION GAP SERPL CALCULATED.3IONS-SCNC: 5 MMOL/L (ref 3–14)
AST SERPL W P-5'-P-CCNC: 22 U/L (ref 0–45)
BILIRUB SERPL-MCNC: 0.2 MG/DL (ref 0.2–1.3)
BLD GP AB SCN SERPL QL: NORMAL
BLOOD BANK CMNT PATIENT-IMP: NORMAL
BUN SERPL-MCNC: 16 MG/DL (ref 7–30)
CALCIUM SERPL-MCNC: 8.4 MG/DL (ref 8.5–10.1)
CHLORIDE SERPL-SCNC: 109 MMOL/L (ref 94–109)
CO2 SERPL-SCNC: 22 MMOL/L (ref 20–32)
CREAT SERPL-MCNC: 0.91 MG/DL (ref 0.52–1.04)
ERYTHROCYTE [DISTWIDTH] IN BLOOD BY AUTOMATED COUNT: 13.7 % (ref 10–15)
GFR SERPL CREATININE-BSD FRML MDRD: 84 ML/MIN/{1.73_M2}
GLUCOSE SERPL-MCNC: 69 MG/DL (ref 70–99)
HCT VFR BLD AUTO: 36.9 % (ref 35–47)
HGB BLD-MCNC: 12.1 G/DL (ref 11.7–15.7)
MCH RBC QN AUTO: 30.6 PG (ref 26.5–33)
MCHC RBC AUTO-ENTMCNC: 32.8 G/DL (ref 31.5–36.5)
MCV RBC AUTO: 93 FL (ref 78–100)
PLATELET # BLD AUTO: 170 10E9/L (ref 150–450)
POTASSIUM SERPL-SCNC: 4.5 MMOL/L (ref 3.4–5.3)
PROT SERPL-MCNC: 5.6 G/DL (ref 6.8–8.8)
RBC # BLD AUTO: 3.96 10E12/L (ref 3.8–5.2)
SODIUM SERPL-SCNC: 136 MMOL/L (ref 133–144)
SPECIMEN EXP DATE BLD: NORMAL
WBC # BLD AUTO: 9.7 10E9/L (ref 4–11)

## 2020-07-29 PROCEDURE — 25000132 ZZH RX MED GY IP 250 OP 250 PS 637: Performed by: OBSTETRICS & GYNECOLOGY

## 2020-07-29 PROCEDURE — 85027 COMPLETE CBC AUTOMATED: CPT | Performed by: OBSTETRICS & GYNECOLOGY

## 2020-07-29 PROCEDURE — 86850 RBC ANTIBODY SCREEN: CPT | Performed by: OBSTETRICS & GYNECOLOGY

## 2020-07-29 PROCEDURE — 86901 BLOOD TYPING SEROLOGIC RH(D): CPT | Performed by: OBSTETRICS & GYNECOLOGY

## 2020-07-29 PROCEDURE — 86900 BLOOD TYPING SEROLOGIC ABO: CPT | Performed by: OBSTETRICS & GYNECOLOGY

## 2020-07-29 PROCEDURE — 36415 COLL VENOUS BLD VENIPUNCTURE: CPT | Performed by: OBSTETRICS & GYNECOLOGY

## 2020-07-29 PROCEDURE — 12000000 ZZH R&B MED SURG/OB

## 2020-07-29 PROCEDURE — 80053 COMPREHEN METABOLIC PANEL: CPT | Performed by: OBSTETRICS & GYNECOLOGY

## 2020-07-29 RX ADMIN — DOXYLAMINE SUCCINATE 25 MG: 25 TABLET ORAL at 22:01

## 2020-07-29 RX ADMIN — DOCUSATE SODIUM AND SENNOSIDES 1 TABLET: 8.6; 5 TABLET, FILM COATED ORAL at 08:15

## 2020-07-29 RX ADMIN — DOCUSATE SODIUM AND SENNOSIDES 1 TABLET: 8.6; 5 TABLET, FILM COATED ORAL at 20:03

## 2020-07-29 RX ADMIN — LABETALOL HYDROCHLORIDE 300 MG: 200 TABLET, FILM COATED ORAL at 16:04

## 2020-07-29 RX ADMIN — LABETALOL HYDROCHLORIDE 300 MG: 200 TABLET, FILM COATED ORAL at 23:55

## 2020-07-29 RX ADMIN — PRENATAL VITAMINS-IRON FUMARATE 27 MG IRON-FOLIC ACID 0.8 MG TABLET 1 TABLET: at 08:16

## 2020-07-29 RX ADMIN — LABETALOL HYDROCHLORIDE 300 MG: 200 TABLET, FILM COATED ORAL at 08:16

## 2020-07-29 NOTE — PROGRESS NOTES
"OB Antepartum Note - Hospital Day 5 IUP 30 3/7 weeks    S:  Patient is doing well.  no contractions.  no vaginal bleeding.  Fetus active.  No LOF.    Tolerating diet.  No headache or epigastric pain.     O:  BP (!) 156/98   Pulse 69   Temp 98.2  F (36.8  C) (Temporal)   Resp 16   Ht 1.778 m (5' 10\")   Wt 83.9 kg (185 lb)   LMP 2019   SpO2 96%   BMI 26.54 kg/m      Intake/Output Summary (Last 24 hours) at 2020 0959  Last data filed at 2020 0635  Gross per 24 hour   Intake 1060 ml   Output 2915 ml   Net -1855 ml     Gen-A&O, NAD  Abd- Gravid, non-tender  EFM-  Baseline 130, accels present, moderate variability, no decelerations  [place]- no contractions     Meds:    doxylamine  25 mg Oral At Bedtime     labetalol  300 mg Oral Q8H JAVIER     prenatal multivitamin w/iron  1 tablet Oral Daily     senna-docusate  1 tablet Oral BID    Or     senna-docusate  2 tablet Oral BID     sodium chloride (PF)  3 mL Intracatheter Q8H       labetalol (NORMODYNE/TRANDATE) algorithm-medication instruction       lactated ringers Stopped (20)     magnesium sulfate Stopped (20)       Labs:   Results for orders placed or performed during the hospital encounter of 20 (from the past 48 hour(s))   Maternal Fetal Medicine IP Consult: Elevated BPs, 30w1d hospitalization; Consultant may enter orders: Yes; Patient to be seen: Routine - within 24 hours; Requesting provider? Attending physician; Name: Dora High Juana, DO     2020 10:22 PM  MFM consultation:    CC: HTN  HPI: Conchita is a 30 y/o  @ 30 1/7 weeks gestation.  Her   pregnancy has been uncomplicated.  Approx 2 weeks ago the patient   began noticing swelling in her feet.  She also had a mild HA.    Her CNM had her come to the office and her BP was noted to be   130/80 (higher than baseline of 110/60-70).  Her UPC was negative   at that time.  She began checking her BPs at home and they were   150/90s.  " She went to [facility] to try a different cuff and it   was the same.  The patient was asked to come for assessment and   ended up having several severe range BPs and was admitted. She   was given oral nifedipine followed by 20mg and 40mg of IV   labetalol in order to achieve a mild range BP.       Her 24 hr urine protein was borderline at .24g/24 hr.  Labs have   otherwise been normal aside from a Cr that is elevated for   pregnancy around 0.95.     She received BMZ x 2 and was on magnesium.  The patient did   complain of HA on and off while on magnesium.      Conchita is on labetalol 200mg TID.     Currently she feels well. Denies HA, visual changes, RUQ pain.    Swelling improved.  +FM, but quieter today.     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     O:  Vitals:    20 1628 20 1809 20 2000 20 2158   BP: (!) 143/84 (!) 157/92 (!) 143/83 (!) 140/80   Resp:       Temp:  99  F (37.2  C)     TempSrc:       SpO2:       Weight:       Height:         Gen: NAD, alert, comfortable  CV: regular rate  Resp: non labored  Abd: gravid, soft, non tender  Ext: 3+ reflexes, 1 beat of clonus bilaterally, trace swelling    FHT: , mod variability, no decels, +accels, category 1    Impression:   28 y/o  @ 30 1/7 weeks with preE with severe features    I discussed with Conchita Rogeliopawan the diagnosis of preE with severe   features given her elevated BPs. Despite not having  proteinuria   currently- severe BPs increase the morbidity and mortality. Women   with gestational hypertension who present with severe-range blood   pressures should be managed with the same approach as for women   with severe preeclampsia.    We discussed that there is no cure or treatment for preeclampsia   and that the natural history is for the disease to worsen and   that the only intervention that improves the maternal condition   is delivery.  We discussed that with a  diagnosis an   attempt is made to weigh the maternal and fetal risks of   continuing the pregnancy against the risks of premature delivery.    We discussed some of the maternal and fetal risks with   preeclampsia including seizure, stroke, uncontrolled   hypertension, renal failure, liver failure, DIC, abruption and   stillbirth.  We also discussed some of the risks of    delivery including, but not limited to, NICU admission,   respiratory morbidity, intracranial hemorrhage, necrotizing   enterocolitis, anemia, hyperbilirubinemia, electrolyte   abnormalities.  We discussed the role of    corticosteroids in reducing the risks of prematurity-related   complications in women deemed to be high risk for    delivery.      We discussed that for preeclampsia with severe features a goal of   34 weeks for delivery.  Severe features are defined as per the   ACOG executive summary on hypertension (systolic blood pressure   of 160 or higher or diastolic blood pressure of 110 or higher on   two occasions at least 4 hours apart while the patient is on bed   rest unless antihypertensive therapy is initiated prior,   thrombocytopenia with platelet count <100,000, impaired liver   function as indicated by liver enzymes twice normal or severe   persistent RUQ/epigastric pain unresponsive to medication or not   accounted for by an alternative diagnosis or both, progressive   renal insufficiency (serum creatinine of 1.1 mg/dL or  doubled),   pulmonary edema or new onset cerebral or visual disturbances.  We   discussed that not all women are candidates for expectant   management until 34 weeks with preeclampsia with severe features   and that daily assessment will be made to determine if she can   continue to be expectantly managed.  Expectant management of preE   with severe features should be inpatient.     Women with PPROM, labor, persistent symptoms, thrombocytopenia   <100K, persistently elevated liver function tests to twice   normal, new onset renal dysfunction or increasing renal   dysfunction, severe fetal growth restriction (EFW <5th   percentile), reversed end-diastolic flow on umbilical artery   Doppler and severe oligohydramnios are not considered candidates   for expectant management with preeclampsia with severe features   and should be delivered after steroids are administered if   stabilization is possible for 48 hours.      Women with uncontrolled severe hypertension, eclampsia, pulmonary   edema, abruption, DIC and nonreassuring fetal status are also not   candidates for expectant management and immediate delivery   without waiting for steroids should be undertaken (see ACOG   executive summary).      The goal blood pressure is 140-155/.  If patient remains in   the upper 150s systolic, recommend increasing labetalol.  For   women with preeclampsia with severe features the use of magnesium   sulfate during delivery (labor or ) and for 24 hours   postpartum is recommended.  For women with a  diagnosis of   preeclampsia evaluation for antiphospholipid antibody syndrome is   recommended.  Aspirin (81 mg daily) for preeclampsia prophylaxis   is recommended in future pregnancies, started prior to 16 weeks.        In the absence of contraindications to expectant management   patients should be hospitalized until delivery for severe   preeclampsia.  At 34 weeks induction of labor is recommended   unless the  patient has a contraindication to vaginal delivery.    Magnesium sulfate infusion is recommended during induction of   labor (and during  delivery) and for 24 hours   post-partum.   Postpartum blood pressure should be monitored for   at least 72 hours and then again at 7 days.  Women being   discharged should have a thorough review of calling guidelines.      Monitoring should include:  - maternal vital signs, fluid intake and urine output should be   monitored at least every 8 hours  - blood pressure may need to be monitored more frequently if   severe range  - symptoms of preeclampsia should be monitored at least every 8   hours  - presence of contractions, membrane rupture, abdominal pain and   bleeding should be monitored at least every 8 hours  - laboratory resting with CBC (including platelets), liver   enzymes and serum creatinine should be performed daily (can be   spaced to every other day if stable).  Can repeat proteinuria   assessment, however it would not change the recommendations for   management.   - TID NSTs  - continuous fetal monitoring is recommended if maternal blood   pressure is labile  - BPP twice per week (MFM can perform on Mon/Thurs)  - serial fetal growth every 2-3 weeks.      Thank you for the opportunity to participate in the care of your   patient.     Discussed with Dr. Timo Manzano, DO [facility]  Maternal Fetal Medicine Specialist  Pager: [phone]  Mobile: [phone]    The patient was seen for an inpatient consultation.  I spent a   total of 30 minutes face to face with Conchita Gross during   today's visit. Over 50% of this time was spent counseling the   patient and/or coordinating care on preE with severe features.           Maternal Fetal US Comprehensive Single    Narrative            Comprehensive  ---------------------------------------------------------------------------------------------------------  Zahida. Name: CONCHITA GROSS  Date:  2020 2:00pm  Pat. NO:  [id number]        Referring  MD: SYLVIA KNAPP  Site:  [facility]       Sonographer: Summre Naik RDMS  :  [id number]        Age:   29  ---------------------------------------------------------------------------------------------------------    INDICATION  ---------------------------------------------------------------------------------------------------------  Gestational hypertension      METHOD  ---------------------------------------------------------------------------------------------------------  Transabdominal ultrasound examination. View: Sufficient.      PREGNANCY  ---------------------------------------------------------------------------------------------------------  Ribeiro pregnancy. Number of fetuses: 1      DATING  ---------------------------------------------------------------------------------------------------------                                           Date                                Details                                                                                      Gest. age                      ARIANNA  LMP                                  2019                                                                                                                       30 w + 1 d                     10/4/2020  U/S                                   2020                         based upon AC, BPD, Femur, HC                                                30 w + 6 d                     2020  Assigned dating                  Dating performed on 2020, based on the LMP                                                            30 w + 1 d                     10/4/2020      GENERAL EVALUATION  ---------------------------------------------------------------------------------------------------------  Cardiac activity present.  bpm.  Fetal movements visualized, .  Presentation cephalic.  Placenta Anterior, There is no evidence of  placenta previa.  Umbilical cord 3 vessel cord.  Amniotic fluid MVP 6.7 cm.      FETAL BIOMETRY  ---------------------------------------------------------------------------------------------------------  Main Fetal Biometry:  BPD                                        78.0                    mm                         31w 2d                Hadlock  OFD                                        103.4                  mm                         30w 3d                 Nicolaides  HC                                          292.0                  mm                          32w 1d                Hadlock  Cerebellum tr                            36.1                   mm                          31w 1d                Nicolaides  AC                                          261.2                  mm                          30w 2d                Hadlock  Femur                                      56.4                   mm                          29w 4d                Hadlock  Humerus                                  50.3                    mm                         29w 3d                Mady  Fetal Weight Calculation:  EFW                                       1,552                  g                                     53%         Addison  EFW (lb,oz)                             3 lb 7                  oz  EFW by                                        Hadlock (BPD-HC-AC-FL)  Head / Face / Neck Biometry:                                             7.0                     mm  CM                                          6.4                     mm      FETAL ANATOMY  ---------------------------------------------------------------------------------------------------------  The following structures appear normal:  Head / Neck                         Cranium. Head size. Head shape. Lateral ventricles. Choroid plexus. Midline falx. Cavum septi pellucidi. Cerebellum. Cisterna magna.                                              Parenchyma. Thalami. Vermis.                                             Neck. Nuchal fold.  Face                                   Lips. Nose. Maxilla. Mandible. Orbits. Lens.  Heart / Thorax                      4-chamber view. RVOT view. LVOT view. Situs. Aortic arch view. Bicaval view. Ductal arch view. Superior vena cava. Inferior vena cava. 3-vessel                                             view. 3-vessel-trachea view. Cardiac position. Cardiac size. Cardiac rhythm.                                             Right lung. Left lung. Diaphragm.  Abdomen                             Abdominal wall. Cord insertion. Stomach. Kidneys. Bladder. Liver. Bowel. Genitals.  Extremities / Skeleton          Right arm. Right hand. Left arm. Left hand. Right leg. Right foot. Left leg. Left foot.    The following structures could not be adequately visualized:  Face                                   Profile.  Spine                                  Cervical spine. Thoracic spine. Lumbar spine. Sacral spine.    Gender: male.      MATERNAL STRUCTURES  ---------------------------------------------------------------------------------------------------------  Cervix                                  Not visualized                                             Appearance: Appears Closed  Right Ovary                          Not visualized  Left Ovary                            Not visualized      BIOPHYSICAL PROFILE  ---------------------------------------------------------------------------------------------------------  0: Fetal breathing movements  2: Gross body movements  2: Fetal tone  2: Amniotic fluid volume  6/8 Biophysical profile score      RECOMMENDATION  ---------------------------------------------------------------------------------------------------------  We discussed the findings on today's ultrasound with the patient.    See consultation in Epic for details.    Twice weekly BPPs until delivery recommended for preE with  severe features.    Thank you for the opportunity to participate in the care of this patient. If you have questions regarding today's evaluation or if we can be of further service, please contact the  [facility] Center.    **Fetal anomalies may be present but not detected**        Impression    IMPRESSION  ---------------------------------------------------------------------------------------------------------  1) Intrauterine pregnancy at 30 1/7 weeks gestational age.  2) None of the anomalies commonly detected by ultrasound were evident in the detailed fetal anatomic survey described above, however the ultrasound is limited due to  advanced gestational age. See checklist above for details.  3) Growth parameters and estimated fetal weight were consistent with an appropriate for gestation age pattern of growth.  4) The amniotic fluid volume appeared normal.  5) The BPP is 6/8 (off for breathing).       Comprehensive metabolic panel   Result Value Ref Range    Sodium 137 133 - 144 mmol/L    Potassium 3.9 3.4 - 5.3 mmol/L    Chloride 109 94 - 109 mmol/L    Carbon Dioxide 20 20 - 32 mmol/L    Anion Gap 8 3 - 14 mmol/L    Glucose 95 70 - 99 mg/dL    Urea Nitrogen 15 7 - 30 mg/dL    Creatinine 0.87 0.52 - 1.04 mg/dL    GFR Estimate 89 >60 mL/min/[1.73_m2]    GFR Estimate If Black >90 >60 mL/min/[1.73_m2]    Calcium 8.8 8.5 - 10.1 mg/dL    Bilirubin Total 0.2 0.2 - 1.3 mg/dL    Albumin 2.4 (L) 3.4 - 5.0 g/dL    Protein Total 5.5 (L) 6.8 - 8.8 g/dL    Alkaline Phosphatase 101 40 - 150 U/L    ALT 34 0 - 50 U/L    AST 21 0 - 45 U/L   CBC with platelets   Result Value Ref Range    WBC 9.8 4.0 - 11.0 10e9/L    RBC Count 3.77 (L) 3.8 - 5.2 10e12/L    Hemoglobin 11.6 (L) 11.7 - 15.7 g/dL    Hematocrit 35.4 35.0 - 47.0 %    MCV 94 78 - 100 fl    MCH 30.8 26.5 - 33.0 pg    MCHC 32.8 31.5 - 36.5 g/dL    RDW 13.9 10.0 - 15.0 %    Platelet Count 148 (L) 150 - 450 10e9/L   Comprehensive metabolic panel   Result Value Ref  Range    Sodium 136 133 - 144 mmol/L    Potassium 4.5 3.4 - 5.3 mmol/L    Chloride 109 94 - 109 mmol/L    Carbon Dioxide 22 20 - 32 mmol/L    Anion Gap 5 3 - 14 mmol/L    Glucose 69 (L) 70 - 99 mg/dL    Urea Nitrogen 16 7 - 30 mg/dL    Creatinine 0.91 0.52 - 1.04 mg/dL    GFR Estimate 84 >60 mL/min/[1.73_m2]    GFR Estimate If Black >90 >60 mL/min/[1.73_m2]    Calcium 8.4 (L) 8.5 - 10.1 mg/dL    Bilirubin Total 0.2 0.2 - 1.3 mg/dL    Albumin 2.4 (L) 3.4 - 5.0 g/dL    Protein Total 5.6 (L) 6.8 - 8.8 g/dL    Alkaline Phosphatase 102 40 - 150 U/L    ALT 34 0 - 50 U/L    AST 22 0 - 45 U/L   CBC with platelets   Result Value Ref Range    WBC 9.7 4.0 - 11.0 10e9/L    RBC Count 3.96 3.8 - 5.2 10e12/L    Hemoglobin 12.1 11.7 - 15.7 g/dL    Hematocrit 36.9 35.0 - 47.0 %    MCV 93 78 - 100 fl    MCH 30.6 26.5 - 33.0 pg    MCHC 32.8 31.5 - 36.5 g/dL    RDW 13.7 10.0 - 15.0 %    Platelet Count 170 150 - 450 10e9/L   ABO/Rh type and screen   Result Value Ref Range    ABO AB     RH(D) Pos     Antibody Screen Neg     Test Valid Only At [facility]        Specimen Expires 2020        A/P: 29 year old  @ 30w3d HD # 5 admitted with gestational HTN/PreE with severe features. BP stable on oral labetalol, goal is -155/. See MFM inpatient consult    1.  Continue expectant inpatient management and if stable plan for IOL at 34 weeks .   2.  Continue Labetalol 300mg TID PO  3. Daily weights, I/Os, labs  4. S/p IV MagSO4 for 48 hrs plan to restart with labor  5. S/p BMZ   6. Growth US q2-3 weeks, last done 20  7. NST tid, twice weekly BPPs (Monday/)  8. The plan of care was discussed with the patient.  She expressed understanding and agreement.    Luz Marina Wong MD  2020

## 2020-07-29 NOTE — PLAN OF CARE
Assumed care of patient at 1400. Pt resting in bed and spouse present at bedside. Afeb, BPs 150s/90s. Dr Kolb notified of 1600 BP, 158/99. Scheduled labetalol administered. Per MFM consult, pt BP goals are 140-55/. Discussed this with patient and questions answered. Pt reassured.  Cont to monitor and assess.

## 2020-07-29 NOTE — PLAN OF CARE
Patient did well through the night. Was able to get some sleep. VSS. FHR category 1 last evening.

## 2020-07-30 ENCOUNTER — HOSPITAL ENCOUNTER (INPATIENT)
Dept: ULTRASOUND IMAGING | Facility: CLINIC | Age: 30
End: 2020-07-30
Attending: OBSTETRICS & GYNECOLOGY
Payer: OTHER GOVERNMENT

## 2020-07-30 LAB
ALBUMIN SERPL-MCNC: 2.5 G/DL (ref 3.4–5)
ALP SERPL-CCNC: 115 U/L (ref 40–150)
ALT SERPL W P-5'-P-CCNC: 30 U/L (ref 0–50)
ANION GAP SERPL CALCULATED.3IONS-SCNC: 6 MMOL/L (ref 3–14)
AST SERPL W P-5'-P-CCNC: 20 U/L (ref 0–45)
BILIRUB SERPL-MCNC: 0.1 MG/DL (ref 0.2–1.3)
BUN SERPL-MCNC: 18 MG/DL (ref 7–30)
CALCIUM SERPL-MCNC: 8.7 MG/DL (ref 8.5–10.1)
CHLORIDE SERPL-SCNC: 111 MMOL/L (ref 94–109)
CO2 SERPL-SCNC: 21 MMOL/L (ref 20–32)
CREAT SERPL-MCNC: 1 MG/DL (ref 0.52–1.04)
ERYTHROCYTE [DISTWIDTH] IN BLOOD BY AUTOMATED COUNT: 13.7 % (ref 10–15)
GFR SERPL CREATININE-BSD FRML MDRD: 75 ML/MIN/{1.73_M2}
GLUCOSE SERPL-MCNC: 117 MG/DL (ref 70–99)
HCT VFR BLD AUTO: 37.6 % (ref 35–47)
HGB BLD-MCNC: 12.4 G/DL (ref 11.7–15.7)
MCH RBC QN AUTO: 30.8 PG (ref 26.5–33)
MCHC RBC AUTO-ENTMCNC: 33 G/DL (ref 31.5–36.5)
MCV RBC AUTO: 93 FL (ref 78–100)
PLATELET # BLD AUTO: 186 10E9/L (ref 150–450)
POTASSIUM SERPL-SCNC: 3.9 MMOL/L (ref 3.4–5.3)
PROT SERPL-MCNC: 5.8 G/DL (ref 6.8–8.8)
RBC # BLD AUTO: 4.03 10E12/L (ref 3.8–5.2)
SODIUM SERPL-SCNC: 138 MMOL/L (ref 133–144)
WBC # BLD AUTO: 9.6 10E9/L (ref 4–11)

## 2020-07-30 PROCEDURE — 12000000 ZZH R&B MED SURG/OB

## 2020-07-30 PROCEDURE — 80053 COMPREHEN METABOLIC PANEL: CPT | Performed by: OBSTETRICS & GYNECOLOGY

## 2020-07-30 PROCEDURE — 25000132 ZZH RX MED GY IP 250 OP 250 PS 637: Performed by: OBSTETRICS & GYNECOLOGY

## 2020-07-30 PROCEDURE — 81050 URINALYSIS VOLUME MEASURE: CPT | Performed by: OBSTETRICS & GYNECOLOGY

## 2020-07-30 PROCEDURE — 76819 FETAL BIOPHYS PROFIL W/O NST: CPT

## 2020-07-30 PROCEDURE — 84156 ASSAY OF PROTEIN URINE: CPT | Performed by: OBSTETRICS & GYNECOLOGY

## 2020-07-30 PROCEDURE — 36415 COLL VENOUS BLD VENIPUNCTURE: CPT | Performed by: OBSTETRICS & GYNECOLOGY

## 2020-07-30 PROCEDURE — 85027 COMPLETE CBC AUTOMATED: CPT | Performed by: OBSTETRICS & GYNECOLOGY

## 2020-07-30 RX ORDER — LABETALOL 200 MG/1
400 TABLET, FILM COATED ORAL DAILY
Status: DISCONTINUED | OUTPATIENT
Start: 2020-07-31 | End: 2020-08-06

## 2020-07-30 RX ADMIN — DOCUSATE SODIUM AND SENNOSIDES 1 TABLET: 8.6; 5 TABLET, FILM COATED ORAL at 08:28

## 2020-07-30 RX ADMIN — PRENATAL VITAMINS-IRON FUMARATE 27 MG IRON-FOLIC ACID 0.8 MG TABLET 1 TABLET: at 08:28

## 2020-07-30 RX ADMIN — DOXYLAMINE SUCCINATE 25 MG: 25 TABLET ORAL at 22:01

## 2020-07-30 RX ADMIN — LABETALOL HYDROCHLORIDE 300 MG: 200 TABLET, FILM COATED ORAL at 16:08

## 2020-07-30 RX ADMIN — DOCUSATE SODIUM AND SENNOSIDES 1 TABLET: 8.6; 5 TABLET, FILM COATED ORAL at 20:01

## 2020-07-30 RX ADMIN — LABETALOL HYDROCHLORIDE 300 MG: 200 TABLET, FILM COATED ORAL at 08:28

## 2020-07-30 ASSESSMENT — MIFFLIN-ST. JEOR: SCORE: 1615.59

## 2020-07-30 NOTE — PROGRESS NOTES
"OB Antepartum Note - Hospital Day 6    S:  Patient is doing well.  Denies headaches, blurry vision, RUQ pain. No contractions.  No vaginal bleeding.  Baby active.  No LOF.  Bowels moving.  Tolerating diet.      O:  BP (!) 142/95   Pulse 78   Temp 99  F (37.2  C)   Resp 16   Ht 1.778 m (5' 10\")   Wt 81.5 kg (179 lb 12 oz)   LMP 2019   SpO2 96%   BMI 25.79 kg/m     Vitals:    20 0748 20 0749 20 0754 20 0844   BP:  (!) 157/97  (!) 142/95   Pulse:       Resp:       Temp: 99  F (37.2  C)      TempSrc:       SpO2:       Weight:   81.5 kg (179 lb 12 oz)    Height:         I/O 1,500 /4,100    Gen-A&O, NAD  Pulm - LCTAB  Abd- Gravid, non-tender  EFM-  Baseline 135, accels present, moderate variability, no decelerations  [place]- ctx none minutes  Cervix-  Deferred   3+ DTRs, 2-beat clonus bilaterally     Labs: morning labs pending.    A/P: 29 year old  @ 30w4d HD # 6 admitted with gestational hypertension with severe features. BP intermittently out of range on labetalol (goal 140-155/). See [facility] inpatient consult, appreciate recs.     1.  Continue expectant inpatient management and if stable plan for IOL at 34 weeks .   2.  Continue Labetalol 300mg TID PO, increase if bp > 155/105 on consecutive checks.  3. Daily weights, I/Os, labs; recheck 24 hour urine for protein as last result 240 mg/24 hours.  4. S/p IV MagSO4 for 48 hrs plan to restart with labor  5. S/p BMZ -  6. Growth US q2-3 weeks, last done 20  7. NST tid, twice weekly BPPs (Monday/Thr)  8. The plan of care was discussed with the patient.  She expressed understanding and agreement.    Mary Pettit MD  2020  8:49 AM   "

## 2020-07-30 NOTE — PROGRESS NOTES
Please see ultrasound report under imaging tab for details on ultrasound performed today.    Sheron Mcclellan MD  , OB/GYN  Maternal-Fetal Medicine  [email or web address]  [phone] (Academic office)  [phone] (Pager)

## 2020-07-30 NOTE — PROGRESS NOTES
Labs reviewed:    No results found for: NTBNPI, NTBNP  Lab Results   Component Value Date    CR 1.00 07/30/2020     Lab Results   Component Value Date    AST 20 07/30/2020    ALT 30 07/30/2020    ALKPHOS 115 07/30/2020    BILITOTAL 0.1 (L) 07/30/2020     Noted slightly high chloride, but no other evidence of acidosis with normal bicarb and normal anion gap.     Creatinine slightly higher, but not twice normal.     BPs occasionally above target range, will increase morning dose of labetalol to 400 mg, keep midday and evening doses at 300 mg.     24 hour urine collection in process.    Continue to monitor.    Mary Pettit MD

## 2020-07-31 LAB
ALBUMIN SERPL-MCNC: 2.7 G/DL (ref 3.4–5)
ALP SERPL-CCNC: 122 U/L (ref 40–150)
ALT SERPL W P-5'-P-CCNC: 29 U/L (ref 0–50)
ANION GAP SERPL CALCULATED.3IONS-SCNC: 4 MMOL/L (ref 3–14)
AST SERPL W P-5'-P-CCNC: 18 U/L (ref 0–45)
BILIRUB SERPL-MCNC: 0.2 MG/DL (ref 0.2–1.3)
BUN SERPL-MCNC: 18 MG/DL (ref 7–30)
CALCIUM SERPL-MCNC: 9.1 MG/DL (ref 8.5–10.1)
CHLORIDE SERPL-SCNC: 112 MMOL/L (ref 94–109)
CO2 SERPL-SCNC: 23 MMOL/L (ref 20–32)
COLLECT DURATION TIME UR: NORMAL H
CREAT 24H UR-MRATE: 0.99 G/(24.H) (ref 0.8–1.8)
CREAT SERPL-MCNC: 1 MG/DL (ref 0.52–1.04)
CREAT UR-MCNC: 60 MG/DL
ERYTHROCYTE [DISTWIDTH] IN BLOOD BY AUTOMATED COUNT: 13.7 % (ref 10–15)
GFR SERPL CREATININE-BSD FRML MDRD: 75 ML/MIN/{1.73_M2}
GLUCOSE SERPL-MCNC: 54 MG/DL (ref 70–99)
HCT VFR BLD AUTO: 39.6 % (ref 35–47)
HGB BLD-MCNC: 12.7 G/DL (ref 11.7–15.7)
MCH RBC QN AUTO: 30 PG (ref 26.5–33)
MCHC RBC AUTO-ENTMCNC: 32.1 G/DL (ref 31.5–36.5)
MCV RBC AUTO: 93 FL (ref 78–100)
PLATELET # BLD AUTO: 198 10E9/L (ref 150–450)
POTASSIUM SERPL-SCNC: 4.3 MMOL/L (ref 3.4–5.3)
PROT 24H UR-MRATE: 0.14 G/(24.H) (ref 0.04–0.23)
PROT SERPL-MCNC: 6.2 G/DL (ref 6.8–8.8)
PROT UR-MCNC: 0.08 G/L
PROT/CREAT 24H UR: 0.14 G/G CR (ref 0–0.2)
RBC # BLD AUTO: 4.24 10E12/L (ref 3.8–5.2)
SODIUM SERPL-SCNC: 139 MMOL/L (ref 133–144)
SPECIMEN VOL UR: NORMAL ML
WBC # BLD AUTO: 9.2 10E9/L (ref 4–11)

## 2020-07-31 PROCEDURE — 25000132 ZZH RX MED GY IP 250 OP 250 PS 637: Performed by: OBSTETRICS & GYNECOLOGY

## 2020-07-31 PROCEDURE — 36415 COLL VENOUS BLD VENIPUNCTURE: CPT | Performed by: OBSTETRICS & GYNECOLOGY

## 2020-07-31 PROCEDURE — 85027 COMPLETE CBC AUTOMATED: CPT | Performed by: OBSTETRICS & GYNECOLOGY

## 2020-07-31 PROCEDURE — 80053 COMPREHEN METABOLIC PANEL: CPT | Performed by: OBSTETRICS & GYNECOLOGY

## 2020-07-31 PROCEDURE — 12000000 ZZH R&B MED SURG/OB

## 2020-07-31 RX ORDER — CALCIUM CARBONATE 500 MG/1
1000 TABLET, CHEWABLE ORAL EVERY 4 HOURS PRN
Status: DISCONTINUED | OUTPATIENT
Start: 2020-07-31 | End: 2020-08-11 | Stop reason: HOSPADM

## 2020-07-31 RX ADMIN — LABETALOL HYDROCHLORIDE 300 MG: 200 TABLET, FILM COATED ORAL at 00:03

## 2020-07-31 RX ADMIN — DOCUSATE SODIUM AND SENNOSIDES 1 TABLET: 8.6; 5 TABLET, FILM COATED ORAL at 08:11

## 2020-07-31 RX ADMIN — DOCUSATE SODIUM AND SENNOSIDES 1 TABLET: 8.6; 5 TABLET, FILM COATED ORAL at 20:41

## 2020-07-31 RX ADMIN — CALCIUM CARBONATE (ANTACID) CHEW TAB 500 MG 1000 MG: 500 CHEW TAB at 20:40

## 2020-07-31 RX ADMIN — DOXYLAMINE SUCCINATE 25 MG: 25 TABLET ORAL at 21:23

## 2020-07-31 RX ADMIN — LABETALOL HYDROCHLORIDE 400 MG: 200 TABLET, FILM COATED ORAL at 08:11

## 2020-07-31 RX ADMIN — LABETALOL HYDROCHLORIDE 300 MG: 200 TABLET, FILM COATED ORAL at 15:47

## 2020-07-31 RX ADMIN — PRENATAL VITAMINS-IRON FUMARATE 27 MG IRON-FOLIC ACID 0.8 MG TABLET 1 TABLET: at 08:11

## 2020-07-31 ASSESSMENT — MIFFLIN-ST. JEOR: SCORE: 1611.28

## 2020-07-31 NOTE — PROGRESS NOTES
"BRIEF NUTRITION ASSESSMENT      REASON FOR ASSESSMENT:  Conchita Boss is a 29 year old female seen by Registered Dietitian for LOS    CURRENT DIET AND INTAKE:  Diet:  Regular               Per flowsheets, patient taking 100% of meals   Breakfast this morning was Filipino toast, sausage, and pineapple  Dinner last night consisted of an omelet and cookie x 2    ANTHROPOMETRICS:  Height: 5' 10\"  Weight:  81.1 kg (179#)(7/31)  Body mass index is 25.66 kg/m    Weight Status: Overweight BMI 25-29.9  IBW:  68.2 kg   %IBW: 119%  Weight History:   Wt Readings from Last 10 Encounters:   07/31/20 81.1 kg (178 lb 12.8 oz)   02/20/20 68 kg (150 lb)   04/19/16 71.2 kg (157 lb)   03/25/16 71.7 kg (158 lb)   02/23/16 70.8 kg (156 lb)   08/25/15 69.2 kg (152 lb 8 oz)   07/01/15 67.6 kg (149 lb)   02/20/15 64.4 kg (142 lb)     Weight has trended upward     LABS:  Labs noted    MALNUTRITION:  Visual Nutrition Focused Physical Assessment (NFPA) not completed due to restrictions on face-to-face patient care during COVID-19 Pandemic. Do not suspect muscle/fat losses.  Patient does not meet two of the criteria necessary for diagnosing malnutrition.     NUTRITION INTERVENTION:  Nutrition Diagnosis:  No nutrition diagnosis at this time.    Implementation:  Nutrition Education:  Per Provider order if indicated.  Patient receiving the weekly cafeteria menu due to anticipated prolonged stay.     FOLLOW UP/MONITORING:   Will re-evaluate in 7 - 10 days, or sooner, if re-consulted.    Delmis Saldivar RD, LD, CNSC   Clinical Dietitian - [facility]             "

## 2020-07-31 NOTE — PROGRESS NOTES
"OB Antepartum Note - Hospital Day 7    S:  Patient is doing well.  Denies headaches, blurry vision, RUQ pain. No contractions.  No vaginal bleeding.  Baby active.  No LOF.     O:  /87   Pulse 78   Temp 99  F (37.2  C)   Resp 16   Ht 1.778 m (5' 10\")   Wt 81.1 kg (178 lb 12.8 oz)   LMP 2019   SpO2 96%   BMI 25.66 kg/m     Vitals:    20 0713 20 0810 20 0811 20 1158   BP:   (!) 150/93 138/87   Pulse:       Resp:       Temp:  99  F (37.2  C)     TempSrc:       SpO2:       Weight: 81.1 kg (178 lb 12.8 oz)      Height:         I/O-1100/4hrs    Gen-A&O, NAD  Pulm - LCTAB  Abd- Gravid, non-tender  EFM-  Baseline 130, accels present, moderate variability, no decelerations  [place]- ctx none minutes  Cervix-  Deferred   2+ DTRs,1-beat clonus bilaterally      Most Recent 2 LFT's:  Recent Labs   Lab Test 20  1108 20  0920   AST 18 20   ALT 29 30   ALKPHOS 122 115   BILITOTAL 0.2 0.1*   Most Recent 3 CBC's:  Recent Labs   Lab Test 20  1108 20  0920 20  0611   WBC 9.2 9.6 9.7   HGB 12.7 12.4 12.1   MCV 93 93 93    186 170   Most Recent 3 BMP's:  Recent Labs   Lab Test 20  1108 20  0920 20  0611    138 136   POTASSIUM 4.3 3.9 4.5   CHLORIDE 112* 111* 109   CO2 23 21 22   BUN 18 18 16   CR 1.00 1.00 0.91   ANIONGAP 4 6 5   SHERRIE 9.1 8.7 8.4*   GLC 54* 117* 69*       A/P: 29 year old  @ 30w5d HD # 7 admitted with gestational hypertension with severe features. BP intermittently out of range on labetalol (goal 140-155/). See M inpatient consult, appreciate recs.    1.  Continue expectant inpatient management and if stable plan for IOL at 34 weeks .   2.  Increased to labetalol 400 mg in am, 300 mg bid in afternoon/evening. Increase if bp > 155/105 on consecutive checks.  3. Daily weights, I/Os, labs; initial 24 hour , finished this am at 8 am, results 140 mg/24 hrs. Still does not meet criteria for preeclampsia.   4. " S/p IV MagSO4 for 48 hrs plan to restart with labor  5. S/p BMZ July 24-25  6. Growth US q2-3 weeks, last done 7/27/20, cephalic on last ultrasound.   7. NST tid, twice weekly BPPs (Monday/Thrusday)  8. The plan of care was discussed with the patient.  She expressed understanding and agreement.    Diana Gray MD   7/31/2020  8:55am

## 2020-07-31 NOTE — PLAN OF CARE
1300  Received report and assumed care.  1400 Pt having consult with NNP\  [phone]  I was in room and evaluated heart tones and strip, answered questions .  Pt having aprox one mild menstrual  tral like cramp per hour. uterus was soft when I palpated and monitor did not .  Will observe for an hour and notify if concerns.

## 2020-07-31 NOTE — PROGRESS NOTES
When in the room to perform NST and assessment, patient began asking questions regarding the process of preeclampsia and when we would move toward delivery. This RN provided education surrounding Pre Eclampsia and reassured the patient that this was nothing that she did. The patient stated that she has some guilt surrounding her stay and her blood pressure issues. After spending forty minutes at the bedside answering every question the patient had and providing reassurance that she did nothing wrong the patient stated she was feeling better. Patient encouraged to call at any point tonight if she has more questions or if she just needs extra support in the room. Patient voiced understanding and denies any further questions or needs at this time.

## 2020-08-01 LAB
ALBUMIN SERPL-MCNC: 2.9 G/DL (ref 3.4–5)
ALP SERPL-CCNC: 129 U/L (ref 40–150)
ALT SERPL W P-5'-P-CCNC: 28 U/L (ref 0–50)
ANION GAP SERPL CALCULATED.3IONS-SCNC: 4 MMOL/L (ref 3–14)
AST SERPL W P-5'-P-CCNC: 19 U/L (ref 0–45)
BILIRUB SERPL-MCNC: 0.2 MG/DL (ref 0.2–1.3)
BUN SERPL-MCNC: 18 MG/DL (ref 7–30)
CALCIUM SERPL-MCNC: 8.9 MG/DL (ref 8.5–10.1)
CHLORIDE SERPL-SCNC: 110 MMOL/L (ref 94–109)
CO2 SERPL-SCNC: 22 MMOL/L (ref 20–32)
CREAT SERPL-MCNC: 1.06 MG/DL (ref 0.52–1.04)
ERYTHROCYTE [DISTWIDTH] IN BLOOD BY AUTOMATED COUNT: 13.7 % (ref 10–15)
GFR SERPL CREATININE-BSD FRML MDRD: 70 ML/MIN/{1.73_M2}
GLUCOSE SERPL-MCNC: 77 MG/DL (ref 70–99)
HCT VFR BLD AUTO: 40.3 % (ref 35–47)
HGB BLD-MCNC: 13.3 G/DL (ref 11.7–15.7)
MCH RBC QN AUTO: 30.9 PG (ref 26.5–33)
MCHC RBC AUTO-ENTMCNC: 33 G/DL (ref 31.5–36.5)
MCV RBC AUTO: 94 FL (ref 78–100)
PLATELET # BLD AUTO: 196 10E9/L (ref 150–450)
POTASSIUM SERPL-SCNC: 3.9 MMOL/L (ref 3.4–5.3)
PROT SERPL-MCNC: 6.4 G/DL (ref 6.8–8.8)
RBC # BLD AUTO: 4.31 10E12/L (ref 3.8–5.2)
SODIUM SERPL-SCNC: 136 MMOL/L (ref 133–144)
WBC # BLD AUTO: 9.1 10E9/L (ref 4–11)

## 2020-08-01 PROCEDURE — 85027 COMPLETE CBC AUTOMATED: CPT | Performed by: OBSTETRICS & GYNECOLOGY

## 2020-08-01 PROCEDURE — 25000132 ZZH RX MED GY IP 250 OP 250 PS 637: Performed by: OBSTETRICS & GYNECOLOGY

## 2020-08-01 PROCEDURE — 12000000 ZZH R&B MED SURG/OB

## 2020-08-01 PROCEDURE — 80053 COMPREHEN METABOLIC PANEL: CPT | Performed by: OBSTETRICS & GYNECOLOGY

## 2020-08-01 PROCEDURE — 36415 COLL VENOUS BLD VENIPUNCTURE: CPT | Performed by: OBSTETRICS & GYNECOLOGY

## 2020-08-01 RX ADMIN — LABETALOL HYDROCHLORIDE 400 MG: 200 TABLET, FILM COATED ORAL at 08:01

## 2020-08-01 RX ADMIN — DOXYLAMINE SUCCINATE 25 MG: 25 TABLET ORAL at 20:40

## 2020-08-01 RX ADMIN — DOCUSATE SODIUM AND SENNOSIDES 1 TABLET: 8.6; 5 TABLET, FILM COATED ORAL at 08:01

## 2020-08-01 RX ADMIN — LABETALOL HYDROCHLORIDE 300 MG: 200 TABLET, FILM COATED ORAL at 15:57

## 2020-08-01 RX ADMIN — PRENATAL VITAMINS-IRON FUMARATE 27 MG IRON-FOLIC ACID 0.8 MG TABLET 1 TABLET: at 08:01

## 2020-08-01 RX ADMIN — DOCUSATE SODIUM AND SENNOSIDES 1 TABLET: 8.6; 5 TABLET, FILM COATED ORAL at 20:04

## 2020-08-01 RX ADMIN — LABETALOL HYDROCHLORIDE 300 MG: 200 TABLET, FILM COATED ORAL at 00:06

## 2020-08-01 ASSESSMENT — MIFFLIN-ST. JEOR: SCORE: 1601.98

## 2020-08-01 NOTE — PLAN OF CARE
Vital signs stable. Denies any HA, vision changes, or RUQ pain. Cat 1 tracing. Denies any cramping or devan. Reports positive fetal movement.

## 2020-08-01 NOTE — PROGRESS NOTES
"OB Antepartum Note - Hospital Day 8    S:  Patient is doing well.  She may be having a few mild contractions, but no pattern and not progressive.  No vaginal bleeding.  Baby active.  No LOF.    Tolerating diet.      O:  BP (!) 145/73   Pulse 71   Temp 97.9  F (36.6  C) (Temporal)   Resp 16   Ht 1.778 m (5' 10\")   Wt 81.1 kg (178 lb 12.8 oz)   LMP 2019   SpO2 96%   BMI 25.66 kg/m    Gen-A&O, NAD  Abd- Gravid, non-tender  EFM-  Baseline 130, accels are present, moderate variability, no decelerations (last NST done)  [place]- no contractions of note  Cervix-  Not examined.     Labs from today pending.  Labs yesterday all trending toward improvement.     ALT  29   (was 39 20)  ASt   18   (20)  Platelets  198,000  (186,000)  Urine protein:creatinine ration 0.14, does not meet criteria.   Serum creatinine 1.0, stable       A/P: 29 year old  @ 30w6d HD # 8 admitted with gestational HTN with severe features.  BP improved on increased dose of Labetalol.  See MFM consultation for detailed recommendations. Patient remains asymptomatic.  Normal fetal movement.      1.  Continue expectant inpatient management and if stable plan for IOL at 34 weeks .   2.   Continue Labetalol at the current dosage.  400 mg orally in the am, 300 mg orally in the afternoon and evening.   If blood pressure is  > 155/105 on consecutive checks, further intervention/alteration in management to be considered.   3. Daily weights, I/Os, labs   4. S/p IV MagSO4 for 48 hrs plan to restart with labor  5. S/p BMZ   6. Growth US q2-3 weeks, last done 20, cephalic on last ultrasound.   7. NST tid, twice weekly BPPs (Monday/)  8. The plan of care was discussed with the patient.  She expressed understanding and agreement.       Kwaku Choi MD  2020    "

## 2020-08-01 NOTE — PLAN OF CARE
BPs stable, pt notes calf and feet cramps; she is wearing her SCDs.  Encouraged her to stretch her calves and rotate ankles.  Will continue to assess.

## 2020-08-01 NOTE — PROGRESS NOTES
Patient notes occasional/infrequent period-like cramps that started 2 days ago; denies pain.  She denies leaking fluid or vaginal bleeding.  She denies headache, epigastric pain, visual disturbances, or increased edema.  She notes little fetal movement as usual; placenta is anterior.  FHTs reactive and reassuring.

## 2020-08-02 LAB
ALBUMIN SERPL-MCNC: 2.6 G/DL (ref 3.4–5)
ALP SERPL-CCNC: 124 U/L (ref 40–150)
ALT SERPL W P-5'-P-CCNC: 25 U/L (ref 0–50)
ANION GAP SERPL CALCULATED.3IONS-SCNC: 5 MMOL/L (ref 3–14)
AST SERPL W P-5'-P-CCNC: 17 U/L (ref 0–45)
BILIRUB SERPL-MCNC: 0.2 MG/DL (ref 0.2–1.3)
BUN SERPL-MCNC: 17 MG/DL (ref 7–30)
CALCIUM SERPL-MCNC: 8.7 MG/DL (ref 8.5–10.1)
CHLORIDE SERPL-SCNC: 112 MMOL/L (ref 94–109)
CO2 SERPL-SCNC: 22 MMOL/L (ref 20–32)
CREAT SERPL-MCNC: 1.07 MG/DL (ref 0.52–1.04)
ERYTHROCYTE [DISTWIDTH] IN BLOOD BY AUTOMATED COUNT: 13.7 % (ref 10–15)
GFR SERPL CREATININE-BSD FRML MDRD: 69 ML/MIN/{1.73_M2}
GLUCOSE SERPL-MCNC: 74 MG/DL (ref 70–99)
HCT VFR BLD AUTO: 39.3 % (ref 35–47)
HGB BLD-MCNC: 13 G/DL (ref 11.7–15.7)
MCH RBC QN AUTO: 30.7 PG (ref 26.5–33)
MCHC RBC AUTO-ENTMCNC: 33.1 G/DL (ref 31.5–36.5)
MCV RBC AUTO: 93 FL (ref 78–100)
PLATELET # BLD AUTO: 196 10E9/L (ref 150–450)
POTASSIUM SERPL-SCNC: 4.3 MMOL/L (ref 3.4–5.3)
PROT SERPL-MCNC: 6 G/DL (ref 6.8–8.8)
RBC # BLD AUTO: 4.23 10E12/L (ref 3.8–5.2)
SODIUM SERPL-SCNC: 139 MMOL/L (ref 133–144)
WBC # BLD AUTO: 8.6 10E9/L (ref 4–11)

## 2020-08-02 PROCEDURE — 80053 COMPREHEN METABOLIC PANEL: CPT | Performed by: OBSTETRICS & GYNECOLOGY

## 2020-08-02 PROCEDURE — 25000132 ZZH RX MED GY IP 250 OP 250 PS 637: Performed by: OBSTETRICS & GYNECOLOGY

## 2020-08-02 PROCEDURE — 12000000 ZZH R&B MED SURG/OB

## 2020-08-02 PROCEDURE — 85027 COMPLETE CBC AUTOMATED: CPT | Performed by: OBSTETRICS & GYNECOLOGY

## 2020-08-02 PROCEDURE — 36415 COLL VENOUS BLD VENIPUNCTURE: CPT | Performed by: OBSTETRICS & GYNECOLOGY

## 2020-08-02 RX ADMIN — LABETALOL HYDROCHLORIDE 300 MG: 200 TABLET, FILM COATED ORAL at 23:56

## 2020-08-02 RX ADMIN — LABETALOL HYDROCHLORIDE 300 MG: 200 TABLET, FILM COATED ORAL at 15:52

## 2020-08-02 RX ADMIN — LABETALOL HYDROCHLORIDE 400 MG: 200 TABLET, FILM COATED ORAL at 08:44

## 2020-08-02 RX ADMIN — DOCUSATE SODIUM AND SENNOSIDES 1 TABLET: 8.6; 5 TABLET, FILM COATED ORAL at 21:03

## 2020-08-02 RX ADMIN — DOXYLAMINE SUCCINATE 25 MG: 25 TABLET ORAL at 21:03

## 2020-08-02 RX ADMIN — LABETALOL HYDROCHLORIDE 300 MG: 200 TABLET, FILM COATED ORAL at 00:04

## 2020-08-02 RX ADMIN — PRENATAL VITAMINS-IRON FUMARATE 27 MG IRON-FOLIC ACID 0.8 MG TABLET 1 TABLET: at 08:45

## 2020-08-02 RX ADMIN — DOCUSATE SODIUM AND SENNOSIDES 1 TABLET: 8.6; 5 TABLET, FILM COATED ORAL at 08:45

## 2020-08-02 ASSESSMENT — MIFFLIN-ST. JEOR: SCORE: 1589.51

## 2020-08-02 NOTE — PLAN OF CARE
Pt doing well, slept most of the night.  BP WNL (see flowsheet).  Denies pain and s/s of preeclampsia.  Pt taking PO labetalol for blood pressures.  Will report off to Laurel GALINDO This am.

## 2020-08-02 NOTE — PLAN OF CARE
Vital signs stable. + fetal movement. Feeling pretty good today. Did notice some cramping on and off today, nothing picked up on toco while monitoring. Encouraged patient to keep nurses informed. Variable decel x1 heard at bedside while monitoring tonight. Monitoring extended. Baby moving a lot. Cat I tracing before and after isolated incident. See doc flow.

## 2020-08-02 NOTE — PROGRESS NOTES
"OB Antepartum Note - Hospital Day 9    S:  Patient is doing well. Pt. Denies HA, BV, epigastric pain. Rare contractions.  no vaginal bleeding.  Baby active.  No LOF.    Tolerating diet.      O:  /75   Pulse 75   Temp 98.1  F (36.7  C)   Resp (P) 16   Ht 1.778 m (5' 10\")   Wt 80.2 kg (176 lb 12 oz)   LMP 2019   SpO2 96%   BMI 25.36 kg/m    Gen-A&O, NAD  Abd- Gravid, non-tender  EFM-  Baseline 135, accels present,  moderate variability, no decelerations  [place]- ctx occasional  Cervix-  Deferred  Lab-pending       A/P: 29 year old  @ 31w0d HD # 9 admitted with gestational hypertension. BP normal yesterday. This mornings dose was late as the pt was sleeping. Pt and  feeling better after taling with the NNP from the NICU    1.  Continue expectant inpatient management and if stable plan for IOL at 34 weeks .   2.   Continue Labetalol at the current dosage.  400 mg orally in the am, 300 mg orally in the afternoon and evening.   If blood pressure is  > 155/105 on consecutive checks, further intervention/alteration in management to be considered.   3. Daily weights, I/Os, labs   4. S/p IV MagSO4 for 48 hrs plan to restart with labor  5. S/p BMZ -  6. Growth US q2-3 weeks, last done 20, cephalic on last ultrasound.   7. NST tid, twice weekly BPPs (Monday/)  8. The plan of care was discussed with the patient.  She expressed understanding and agreement.lobo Hernández MD  2020    "

## 2020-08-02 NOTE — PLAN OF CARE
VSS however increased BP's this morning possibly due to the fact labetolol was given at 0840 and not 0800 due to patient sleeping.  Otherwise vitals have been within goal range.  Denies vision changes, headache, and epigastric pain. Monitors placed this morning, FHT show moderated variability with accels, no contractions.  For 4pm monitoring one deceleration was noted for 30 seconds to 80's and back to baseline, moderate variability with accelerations noted before and after deceleration.  Dr. Hernández was notified, no new orders given.  Pt denies feeling any contractions.  Hydrating and voiding well. Continue to monitor.

## 2020-08-03 ENCOUNTER — HOSPITAL ENCOUNTER (INPATIENT)
Dept: ULTRASOUND IMAGING | Facility: CLINIC | Age: 30
End: 2020-08-03
Attending: OBSTETRICS & GYNECOLOGY
Payer: OTHER GOVERNMENT

## 2020-08-03 LAB
ALBUMIN SERPL-MCNC: 2.6 G/DL (ref 3.4–5)
ALP SERPL-CCNC: 126 U/L (ref 40–150)
ALT SERPL W P-5'-P-CCNC: 25 U/L (ref 0–50)
ANION GAP SERPL CALCULATED.3IONS-SCNC: 8 MMOL/L (ref 3–14)
AST SERPL W P-5'-P-CCNC: 22 U/L (ref 0–45)
BILIRUB SERPL-MCNC: 0.2 MG/DL (ref 0.2–1.3)
BUN SERPL-MCNC: 15 MG/DL (ref 7–30)
CALCIUM SERPL-MCNC: 8.6 MG/DL (ref 8.5–10.1)
CHLORIDE SERPL-SCNC: 110 MMOL/L (ref 94–109)
CO2 SERPL-SCNC: 21 MMOL/L (ref 20–32)
CREAT SERPL-MCNC: 1.06 MG/DL (ref 0.52–1.04)
ERYTHROCYTE [DISTWIDTH] IN BLOOD BY AUTOMATED COUNT: 13.7 % (ref 10–15)
GFR SERPL CREATININE-BSD FRML MDRD: 70 ML/MIN/{1.73_M2}
GLUCOSE SERPL-MCNC: 98 MG/DL (ref 70–99)
HCT VFR BLD AUTO: 39.1 % (ref 35–47)
HGB BLD-MCNC: 12.8 G/DL (ref 11.7–15.7)
MCH RBC QN AUTO: 30.5 PG (ref 26.5–33)
MCHC RBC AUTO-ENTMCNC: 32.7 G/DL (ref 31.5–36.5)
MCV RBC AUTO: 93 FL (ref 78–100)
PLATELET # BLD AUTO: 198 10E9/L (ref 150–450)
POTASSIUM SERPL-SCNC: 3.7 MMOL/L (ref 3.4–5.3)
PROT SERPL-MCNC: 5.7 G/DL (ref 6.8–8.8)
RBC # BLD AUTO: 4.19 10E12/L (ref 3.8–5.2)
SODIUM SERPL-SCNC: 139 MMOL/L (ref 133–144)
WBC # BLD AUTO: 7.2 10E9/L (ref 4–11)

## 2020-08-03 PROCEDURE — 36415 COLL VENOUS BLD VENIPUNCTURE: CPT | Performed by: OBSTETRICS & GYNECOLOGY

## 2020-08-03 PROCEDURE — 80053 COMPREHEN METABOLIC PANEL: CPT | Performed by: OBSTETRICS & GYNECOLOGY

## 2020-08-03 PROCEDURE — 25000132 ZZH RX MED GY IP 250 OP 250 PS 637: Performed by: OBSTETRICS & GYNECOLOGY

## 2020-08-03 PROCEDURE — 85027 COMPLETE CBC AUTOMATED: CPT | Performed by: OBSTETRICS & GYNECOLOGY

## 2020-08-03 PROCEDURE — 12000000 ZZH R&B MED SURG/OB

## 2020-08-03 PROCEDURE — 76819 FETAL BIOPHYS PROFIL W/O NST: CPT

## 2020-08-03 RX ADMIN — DOCUSATE SODIUM AND SENNOSIDES 1 TABLET: 8.6; 5 TABLET, FILM COATED ORAL at 07:54

## 2020-08-03 RX ADMIN — LABETALOL HYDROCHLORIDE 400 MG: 200 TABLET, FILM COATED ORAL at 07:53

## 2020-08-03 RX ADMIN — LABETALOL HYDROCHLORIDE 300 MG: 200 TABLET, FILM COATED ORAL at 16:10

## 2020-08-03 RX ADMIN — DOCUSATE SODIUM AND SENNOSIDES 1 TABLET: 8.6; 5 TABLET, FILM COATED ORAL at 21:03

## 2020-08-03 RX ADMIN — LABETALOL HYDROCHLORIDE 300 MG: 200 TABLET, FILM COATED ORAL at 23:59

## 2020-08-03 RX ADMIN — PRENATAL VITAMINS-IRON FUMARATE 27 MG IRON-FOLIC ACID 0.8 MG TABLET 1 TABLET: at 07:54

## 2020-08-03 RX ADMIN — DOXYLAMINE SUCCINATE 25 MG: 25 TABLET ORAL at 21:03

## 2020-08-03 ASSESSMENT — MIFFLIN-ST. JEOR: SCORE: 1615.82

## 2020-08-03 NOTE — PLAN OF CARE
Patient has had blood pressures within normal limits this shift. Denies pain. Taking oral Labetalol. Agrees with plan of care. Slept well.

## 2020-08-03 NOTE — PROGRESS NOTES
"OB Antepartum Note - Hospital Day 10 - gestational HTN    S:  Patient is doing well. Pt. Denies HA, BV, epigastric pain. Rare contractions.  no vaginal bleeding.  Baby active.  No LOF.    Tolerating diet.      O:  BP (!) 147/92   Pulse 71   Temp 98.1  F (36.7  C) (Temporal)   Resp 16   Ht 1.778 m (5' 10\")   Wt 81.6 kg (179 lb 12.8 oz)   LMP 2019   SpO2 96%   BMI 25.80 kg/m    Gen-A&O, NAD  Abd- Gravid, non-tender  EFM-  Baseline 135, accels present,  moderate variability, no decelerations  [place]- ctx occasional  Cervix-  Deferred  Lab- normal LFTs, platelets and renal function       A/P: 29 year old  @ 31w1d HD # 10 admitted with gestational hypertension. Most blood pressures in normal range; occ elevated in the am (140-150/90's).     1.  Continue expectant inpatient management and if maternal and fetal status remain stable plan for IOL at 34 weeks .   2.   Continue Labetalol at the current dosage.  400 mg orally in the am, 300 mg orally in the afternoon and evening.   If blood pressure is  > 155/105 on consecutive checks, further intervention/alteration in management to be considered.   3. Daily weights, I/Os, plan to change labs to every other day unless patient develops new symptoms   4. S/p IV MagSO4 for 48 hrs for neuroprotection on initial admission; if pt require induction due to blood pressures in severe range Mg++ will be ordered for maternal seizure prophylaxsis  5. S/p BMZ   6. Growth US q2-3 weeks, last done 20, cephalic on last ultrasound.   7. NST tid, twice weekly BPPs (Monday/Thr)  8. The plan of care was discussed with the patient and her .  testing explained.  She expressed understanding and agreement.     Valeria Greenwood MD  8/3/2020    "

## 2020-08-04 PROCEDURE — 12000000 ZZH R&B MED SURG/OB

## 2020-08-04 PROCEDURE — 25000132 ZZH RX MED GY IP 250 OP 250 PS 637: Performed by: OBSTETRICS & GYNECOLOGY

## 2020-08-04 RX ADMIN — DOXYLAMINE SUCCINATE 25 MG: 25 TABLET ORAL at 20:02

## 2020-08-04 RX ADMIN — LABETALOL HYDROCHLORIDE 300 MG: 200 TABLET, FILM COATED ORAL at 16:11

## 2020-08-04 RX ADMIN — PRENATAL VITAMINS-IRON FUMARATE 27 MG IRON-FOLIC ACID 0.8 MG TABLET 1 TABLET: at 07:54

## 2020-08-04 RX ADMIN — LABETALOL HYDROCHLORIDE 300 MG: 200 TABLET, FILM COATED ORAL at 23:56

## 2020-08-04 RX ADMIN — DOCUSATE SODIUM AND SENNOSIDES 2 TABLET: 8.6; 5 TABLET, FILM COATED ORAL at 19:56

## 2020-08-04 RX ADMIN — LABETALOL HYDROCHLORIDE 400 MG: 200 TABLET, FILM COATED ORAL at 07:53

## 2020-08-04 RX ADMIN — DOCUSATE SODIUM AND SENNOSIDES 1 TABLET: 8.6; 5 TABLET, FILM COATED ORAL at 07:54

## 2020-08-04 ASSESSMENT — MIFFLIN-ST. JEOR: SCORE: 1620.13

## 2020-08-04 NOTE — PLAN OF CARE
No changes in pt all day today. Denies any sx of preeclampsia. BPs elevated, but stable. Denies any LOF/bleeding. BPP 8/8 today.

## 2020-08-04 NOTE — PROGRESS NOTES
"OB Antepartum Note - Hospital Day 11    S:  Patient is doing well.  no contractions.  no vaginal bleeding.  Denies HA. Baby active.  No LOF.    Tolerating diet.      O:  /79   Pulse 74   Temp 98.2  F (36.8  C) (Temporal)   Resp 16   Ht 1.778 m (5' 10\")   Wt 81.6 kg (179 lb 12.8 oz)   LMP 2019   SpO2 96%   BMI 25.80 kg/m    Gen-A&O, NAD  Abd- Gravid, non-tender  EFM-  NST R overnight. Current NST pending  [place]- rare  Cervix-  Not checked  LE nontender. pneumoboots not on currently but wears most of the time per pt    Labs yesterday normal  BPP yesterday , vtx, MVP 4.6       A/P: 29 year old  @ 31w2d HD # 11 admitted with gestational htn. BPs controlled on po labetalol tid     1.  Continue expectant inpatient management and if maternal and fetal status remain stable plan for IOL at 34 weeks .   2.   Continue Labetalol at the current dosage.  400 mg orally in the am, 300 mg orally in the afternoon and evening.   If blood pressure is  > 155/105 on consecutive checks, further intervention/alteration in management to be considered.   3. Daily weights, I/Os, plan to change labs to every other day unless patient develops new symptoms   4. S/p IV MagSO4 for 48 hrs for neuroprotection on initial admission; if pt require induction due to blood pressures in severe range Mg++ will be ordered for maternal seizure prophylaxsis  5. S/p BMZ   6. Growth US q2-3 weeks, last done 20, cephalic on last ultrasound.   7. NST tid, twice weekly BPPs (Monday/Thr)        Lindsay Rajan MD  2020    "

## 2020-08-04 NOTE — PLAN OF CARE
Pt states she slept well. Denies any symptom of preeclampsia and labor.   Will continue antepartum cares.

## 2020-08-04 NOTE — PLAN OF CARE
Pt stable tonight, labetalol TID, VSS, see flowsheet record.  No change in status. Denies HA, visual disturbances, epigastric pain. Report given to JAYDEN Jeffers.

## 2020-08-05 LAB
ALBUMIN SERPL-MCNC: 2.6 G/DL (ref 3.4–5)
ALP SERPL-CCNC: 115 U/L (ref 40–150)
ALT SERPL W P-5'-P-CCNC: 22 U/L (ref 0–50)
ANION GAP SERPL CALCULATED.3IONS-SCNC: 7 MMOL/L (ref 3–14)
AST SERPL W P-5'-P-CCNC: 17 U/L (ref 0–45)
BILIRUB SERPL-MCNC: 0.2 MG/DL (ref 0.2–1.3)
BUN SERPL-MCNC: 17 MG/DL (ref 7–30)
CALCIUM SERPL-MCNC: 8.4 MG/DL (ref 8.5–10.1)
CHLORIDE SERPL-SCNC: 109 MMOL/L (ref 94–109)
CO2 SERPL-SCNC: 20 MMOL/L (ref 20–32)
CREAT SERPL-MCNC: 1.05 MG/DL (ref 0.52–1.04)
ERYTHROCYTE [DISTWIDTH] IN BLOOD BY AUTOMATED COUNT: 13.5 % (ref 10–15)
GFR SERPL CREATININE-BSD FRML MDRD: 71 ML/MIN/{1.73_M2}
GLUCOSE SERPL-MCNC: 71 MG/DL (ref 70–99)
HCT VFR BLD AUTO: 38.2 % (ref 35–47)
HGB BLD-MCNC: 12.5 G/DL (ref 11.7–15.7)
MCH RBC QN AUTO: 30.2 PG (ref 26.5–33)
MCHC RBC AUTO-ENTMCNC: 32.7 G/DL (ref 31.5–36.5)
MCV RBC AUTO: 92 FL (ref 78–100)
PLATELET # BLD AUTO: 185 10E9/L (ref 150–450)
POTASSIUM SERPL-SCNC: 4.1 MMOL/L (ref 3.4–5.3)
PROT SERPL-MCNC: 5.8 G/DL (ref 6.8–8.8)
RBC # BLD AUTO: 4.14 10E12/L (ref 3.8–5.2)
SODIUM SERPL-SCNC: 136 MMOL/L (ref 133–144)
WBC # BLD AUTO: 9.1 10E9/L (ref 4–11)

## 2020-08-05 PROCEDURE — 80053 COMPREHEN METABOLIC PANEL: CPT | Performed by: OBSTETRICS & GYNECOLOGY

## 2020-08-05 PROCEDURE — 12000000 ZZH R&B MED SURG/OB

## 2020-08-05 PROCEDURE — 25000132 ZZH RX MED GY IP 250 OP 250 PS 637: Performed by: OBSTETRICS & GYNECOLOGY

## 2020-08-05 PROCEDURE — 36415 COLL VENOUS BLD VENIPUNCTURE: CPT | Performed by: OBSTETRICS & GYNECOLOGY

## 2020-08-05 PROCEDURE — 85027 COMPLETE CBC AUTOMATED: CPT | Performed by: OBSTETRICS & GYNECOLOGY

## 2020-08-05 RX ADMIN — DOCUSATE SODIUM AND SENNOSIDES 1 TABLET: 8.6; 5 TABLET, FILM COATED ORAL at 08:33

## 2020-08-05 RX ADMIN — LABETALOL HYDROCHLORIDE 300 MG: 200 TABLET, FILM COATED ORAL at 16:31

## 2020-08-05 RX ADMIN — DOCUSATE SODIUM AND SENNOSIDES 2 TABLET: 8.6; 5 TABLET, FILM COATED ORAL at 19:49

## 2020-08-05 RX ADMIN — DOXYLAMINE SUCCINATE 25 MG: 25 TABLET ORAL at 21:13

## 2020-08-05 RX ADMIN — PRENATAL VITAMINS-IRON FUMARATE 27 MG IRON-FOLIC ACID 0.8 MG TABLET 1 TABLET: at 08:30

## 2020-08-05 RX ADMIN — LABETALOL HYDROCHLORIDE 400 MG: 200 TABLET, FILM COATED ORAL at 08:31

## 2020-08-05 ASSESSMENT — MIFFLIN-ST. JEOR: SCORE: 1612.19

## 2020-08-05 NOTE — PROGRESS NOTES
"SPIRITUAL HEALTH SERVICES  SPIRITUAL ASSESSMENT Progress Note  [facility] L&D     REFERRAL SOURCE: Lengthy Hospital Stay    Late Entry: This visit occurred on 8/4.     I shared a reflective conversation with Conchita today, integrating elements of health and family narratives.    Saba processed what this hospitalization has been like for her, how it was unexpected and ways she is adjusting to changes and finding support.    She names her partner, family and friends as significant sources of support for her during this time and shared ways she and her partner work as a team.    She reflected on the difficulty of being in the hospital, sharing she is an extrovert and also called it a \"blessing in disguise\" sharing the time has allowed her and her partner to lean on each other more and has given her the time for slowness, quiet and rest.    Saba shares she will hopefully be here a few weeks and welcomes spiritual health visits. She shares she has found meditation helpful and asked for a visit later this week for guided meditation.     PLAN: I will continue to follow and will visit on Friday for guided meditation.     Juliet Bowman  Associate    Phone: [phone]  Pager: [phone]    "

## 2020-08-05 NOTE — PROGRESS NOTES
"OB Antepartum Note - Hospital Day 12    S:  Patient is doing well.  No contractions.  No vaginal bleeding.  Baby active.  No LOF.    Tolerating diet.  Feeling well and feeling optimistic regarding the prognosis of her pregnancy.     O:  BP (!) 145/84   Pulse 74   Temp 98.6  F (37  C) (Axillary)   Resp 16   Ht 1.778 m (5' 10\")   Wt 82 kg (180 lb 12 oz)   LMP 2019   SpO2 96%   BMI 25.93 kg/m    Gen-A&O, NAD  Abd- Gravid, non-tender  EFM-  Baseline 125, accels are present, moderate variability, no decelerations  [place]- no contractions  Cervix- not examined.     Labs from today all normal.  Hb 12.5, stable                                                Platelets  185,000,  Was 198,000 yesterday                                                AST 17, down from 22 yesterday                                               ALT  22 , down from 25 yesterday                                               Creatinine 1.05,  Was 1.06 yesterday  BPP 8/8, normal STEVEN and MVP on 8/3/20    /P: 29 year old  @ 31w2d HD # 11 admitted with gestational htn. BPs controlled on po labetalol tid.  Stable clinical status without evidence of progressive pre-E or HELLP syndrome.      1.  Continue expectant inpatient management and if maternal and fetal status remain stable plan for IOL at 34 weeks .   2.   Continue Labetalol at the current dosage.  400 mg orally in the am, 300 mg orally in the afternoon and evening.   If blood pressure is  > 155/105 on consecutive checks, further intervention/alteration in management to be considered.   3. Daily weights, I/Os, plan to change labs to every other day unless patient develops new symptoms   4. S/p IV MagSO4 for 48 hrs for neuroprotection on initial admission; if pt require induction due to blood pressures in severe range Mg++ will be ordered for maternal seizure prophylaxsis  5. S/p BMZ   6. Growth US q2-3 weeks, last done 20, cephalic on last ultrasound.   7. NST tid, " twice weekly BPPs (Monday/Thrusday)         Kwaku Choi MD  8/5/2020

## 2020-08-05 NOTE — PLAN OF CARE
Pt resting comfortably overnight. Continue with T.D. monitoring. BPs WDL. Denies any other symptoms. I&Os maintained. Will continue to monitor and update M.D. as needed

## 2020-08-05 NOTE — PROVIDER NOTIFICATION
08/05/20 0831   Provider Notification   Provider Name/Title Jimbo   Method of Notification At Bedside   Request Evaluate in Person   Notification Reason Membrane Status;Uterine Activity;Maternal Vital Sign Change;Lab/Diagnostic Study;Status Update

## 2020-08-06 ENCOUNTER — HOSPITAL ENCOUNTER (INPATIENT)
Dept: ULTRASOUND IMAGING | Facility: CLINIC | Age: 30
End: 2020-08-06
Attending: OBSTETRICS & GYNECOLOGY
Payer: OTHER GOVERNMENT

## 2020-08-06 ENCOUNTER — ANESTHESIA EVENT (OUTPATIENT)
Dept: OBGYN | Facility: CLINIC | Age: 30
End: 2020-08-06
Payer: OTHER GOVERNMENT

## 2020-08-06 ENCOUNTER — ANESTHESIA (OUTPATIENT)
Dept: OBGYN | Facility: CLINIC | Age: 30
End: 2020-08-06
Payer: OTHER GOVERNMENT

## 2020-08-06 PROBLEM — Z98.891 S/P PRIMARY LOW TRANSVERSE C-SECTION: Status: ACTIVE | Noted: 2020-08-06

## 2020-08-06 LAB
ABO + RH BLD: NORMAL
ABO + RH BLD: NORMAL
ALT SERPL W P-5'-P-CCNC: 23 U/L (ref 0–50)
AST SERPL W P-5'-P-CCNC: 19 U/L (ref 0–45)
BLD GP AB SCN SERPL QL: NORMAL
BLOOD BANK CMNT PATIENT-IMP: NORMAL
CREAT SERPL-MCNC: 1.1 MG/DL (ref 0.52–1.04)
CREAT UR-MCNC: 21 MG/DL
ERYTHROCYTE [DISTWIDTH] IN BLOOD BY AUTOMATED COUNT: 13.5 % (ref 10–15)
FIBRINOGEN PPP-MCNC: 401 MG/DL (ref 200–420)
GFR SERPL CREATININE-BSD FRML MDRD: 67 ML/MIN/{1.73_M2}
HCT VFR BLD AUTO: 38.8 % (ref 35–47)
HGB BLD-MCNC: 12.8 G/DL (ref 11.7–15.7)
INR PPP: 0.84 (ref 0.86–1.14)
MCH RBC QN AUTO: 30.5 PG (ref 26.5–33)
MCHC RBC AUTO-ENTMCNC: 33 G/DL (ref 31.5–36.5)
MCV RBC AUTO: 92 FL (ref 78–100)
PLATELET # BLD AUTO: 190 10E9/L (ref 150–450)
PROT UR-MCNC: <0.05 G/L
PROT/CREAT 24H UR: NORMAL G/G CR (ref 0–0.2)
RBC # BLD AUTO: 4.2 10E12/L (ref 3.8–5.2)
SPECIMEN EXP DATE BLD: NORMAL
WBC # BLD AUTO: 10 10E9/L (ref 4–11)

## 2020-08-06 PROCEDURE — 86850 RBC ANTIBODY SCREEN: CPT | Performed by: OBSTETRICS & GYNECOLOGY

## 2020-08-06 PROCEDURE — 82565 ASSAY OF CREATININE: CPT | Performed by: OBSTETRICS & GYNECOLOGY

## 2020-08-06 PROCEDURE — 25000128 H RX IP 250 OP 636: Performed by: OBSTETRICS & GYNECOLOGY

## 2020-08-06 PROCEDURE — 25000128 H RX IP 250 OP 636: Performed by: NURSE ANESTHETIST, CERTIFIED REGISTERED

## 2020-08-06 PROCEDURE — 88307 TISSUE EXAM BY PATHOLOGIST: CPT | Performed by: OBSTETRICS & GYNECOLOGY

## 2020-08-06 PROCEDURE — 25000132 ZZH RX MED GY IP 250 OP 250 PS 637: Performed by: OBSTETRICS & GYNECOLOGY

## 2020-08-06 PROCEDURE — 84156 ASSAY OF PROTEIN URINE: CPT | Performed by: OBSTETRICS & GYNECOLOGY

## 2020-08-06 PROCEDURE — 76820 UMBILICAL ARTERY ECHO: CPT | Performed by: OBSTETRICS & GYNECOLOGY

## 2020-08-06 PROCEDURE — 25000125 ZZHC RX 250: Performed by: OBSTETRICS & GYNECOLOGY

## 2020-08-06 PROCEDURE — 85610 PROTHROMBIN TIME: CPT | Performed by: OBSTETRICS & GYNECOLOGY

## 2020-08-06 PROCEDURE — 27210794 ZZH OR GENERAL SUPPLY STERILE: Performed by: OBSTETRICS & GYNECOLOGY

## 2020-08-06 PROCEDURE — 37000008 ZZH ANESTHESIA TECHNICAL FEE, 1ST 30 MIN: Performed by: OBSTETRICS & GYNECOLOGY

## 2020-08-06 PROCEDURE — 36000056 ZZH SURGERY LEVEL 3 1ST 30 MIN: Performed by: OBSTETRICS & GYNECOLOGY

## 2020-08-06 PROCEDURE — 37000009 ZZH ANESTHESIA TECHNICAL FEE, EACH ADDTL 15 MIN: Performed by: OBSTETRICS & GYNECOLOGY

## 2020-08-06 PROCEDURE — 84460 ALANINE AMINO (ALT) (SGPT): CPT | Performed by: OBSTETRICS & GYNECOLOGY

## 2020-08-06 PROCEDURE — 86900 BLOOD TYPING SEROLOGIC ABO: CPT | Performed by: OBSTETRICS & GYNECOLOGY

## 2020-08-06 PROCEDURE — 36000058 ZZH SURGERY LEVEL 3 EA 15 ADDTL MIN: Performed by: OBSTETRICS & GYNECOLOGY

## 2020-08-06 PROCEDURE — 71000012 ZZH RECOVERY PHASE 1 LEVEL 1 FIRST HR: Performed by: OBSTETRICS & GYNECOLOGY

## 2020-08-06 PROCEDURE — 86780 TREPONEMA PALLIDUM: CPT | Performed by: OBSTETRICS & GYNECOLOGY

## 2020-08-06 PROCEDURE — 85384 FIBRINOGEN ACTIVITY: CPT | Performed by: OBSTETRICS & GYNECOLOGY

## 2020-08-06 PROCEDURE — 76816 OB US FOLLOW-UP PER FETUS: CPT

## 2020-08-06 PROCEDURE — 25800030 ZZH RX IP 258 OP 636: Performed by: NURSE ANESTHETIST, CERTIFIED REGISTERED

## 2020-08-06 PROCEDURE — 76819 FETAL BIOPHYS PROFIL W/O NST: CPT

## 2020-08-06 PROCEDURE — 85027 COMPLETE CBC AUTOMATED: CPT | Performed by: OBSTETRICS & GYNECOLOGY

## 2020-08-06 PROCEDURE — 25000125 ZZHC RX 250: Performed by: NURSE ANESTHETIST, CERTIFIED REGISTERED

## 2020-08-06 PROCEDURE — 25800030 ZZH RX IP 258 OP 636: Performed by: OBSTETRICS & GYNECOLOGY

## 2020-08-06 PROCEDURE — 12000035 ZZH R&B POSTPARTUM

## 2020-08-06 PROCEDURE — 36415 COLL VENOUS BLD VENIPUNCTURE: CPT | Performed by: OBSTETRICS & GYNECOLOGY

## 2020-08-06 PROCEDURE — 86901 BLOOD TYPING SEROLOGIC RH(D): CPT | Performed by: OBSTETRICS & GYNECOLOGY

## 2020-08-06 PROCEDURE — 25000132 ZZH RX MED GY IP 250 OP 250 PS 637

## 2020-08-06 PROCEDURE — 84450 TRANSFERASE (AST) (SGOT): CPT | Performed by: OBSTETRICS & GYNECOLOGY

## 2020-08-06 PROCEDURE — 88307 TISSUE EXAM BY PATHOLOGIST: CPT | Mod: 26 | Performed by: OBSTETRICS & GYNECOLOGY

## 2020-08-06 RX ORDER — FENTANYL CITRATE 50 UG/ML
25-50 INJECTION, SOLUTION INTRAMUSCULAR; INTRAVENOUS
Status: DISCONTINUED | OUTPATIENT
Start: 2020-08-06 | End: 2020-08-06 | Stop reason: HOSPADM

## 2020-08-06 RX ORDER — IBUPROFEN 400 MG/1
800 TABLET, FILM COATED ORAL EVERY 6 HOURS
Status: DISCONTINUED | OUTPATIENT
Start: 2020-08-07 | End: 2020-08-09

## 2020-08-06 RX ORDER — OXYTOCIN 10 [USP'U]/ML
10 INJECTION, SOLUTION INTRAMUSCULAR; INTRAVENOUS
Status: DISCONTINUED | OUTPATIENT
Start: 2020-08-06 | End: 2020-08-08

## 2020-08-06 RX ORDER — OXYTOCIN/0.9 % SODIUM CHLORIDE 30/500 ML
100-340 PLASTIC BAG, INJECTION (ML) INTRAVENOUS CONTINUOUS PRN
Status: DISCONTINUED | OUTPATIENT
Start: 2020-08-06 | End: 2020-08-08

## 2020-08-06 RX ORDER — NALOXONE HYDROCHLORIDE 0.4 MG/ML
.1-.4 INJECTION, SOLUTION INTRAMUSCULAR; INTRAVENOUS; SUBCUTANEOUS
Status: DISCONTINUED | OUTPATIENT
Start: 2020-08-06 | End: 2020-08-06

## 2020-08-06 RX ORDER — CEFAZOLIN SODIUM 2 G/100ML
2 INJECTION, SOLUTION INTRAVENOUS
Status: COMPLETED | OUTPATIENT
Start: 2020-08-06 | End: 2020-08-06

## 2020-08-06 RX ORDER — CARBOPROST TROMETHAMINE 250 UG/ML
250 INJECTION, SOLUTION INTRAMUSCULAR
Status: DISCONTINUED | OUTPATIENT
Start: 2020-08-06 | End: 2020-08-08

## 2020-08-06 RX ORDER — AMOXICILLIN 250 MG
1 CAPSULE ORAL 2 TIMES DAILY
Status: DISCONTINUED | OUTPATIENT
Start: 2020-08-06 | End: 2020-08-11 | Stop reason: HOSPADM

## 2020-08-06 RX ORDER — ACETAMINOPHEN 325 MG/1
TABLET ORAL
Status: COMPLETED
Start: 2020-08-06 | End: 2020-08-06

## 2020-08-06 RX ORDER — MORPHINE SULFATE 1 MG/ML
INJECTION, SOLUTION EPIDURAL; INTRATHECAL; INTRAVENOUS PRN
Status: DISCONTINUED | OUTPATIENT
Start: 2020-08-06 | End: 2020-08-06

## 2020-08-06 RX ORDER — SODIUM CHLORIDE, SODIUM LACTATE, POTASSIUM CHLORIDE, CALCIUM CHLORIDE 600; 310; 30; 20 MG/100ML; MG/100ML; MG/100ML; MG/100ML
INJECTION, SOLUTION INTRAVENOUS CONTINUOUS
Status: DISCONTINUED | OUTPATIENT
Start: 2020-08-06 | End: 2020-08-06 | Stop reason: HOSPADM

## 2020-08-06 RX ORDER — SIMETHICONE 80 MG
80 TABLET,CHEWABLE ORAL 4 TIMES DAILY PRN
Status: DISCONTINUED | OUTPATIENT
Start: 2020-08-06 | End: 2020-08-11 | Stop reason: HOSPADM

## 2020-08-06 RX ORDER — BETAMETHASONE SODIUM PHOSPHATE AND BETAMETHASONE ACETATE 3; 3 MG/ML; MG/ML
12 INJECTION, SUSPENSION INTRA-ARTICULAR; INTRALESIONAL; INTRAMUSCULAR; SOFT TISSUE EVERY 24 HOURS
Status: DISPENSED | OUTPATIENT
Start: 2020-08-06 | End: 2020-08-08

## 2020-08-06 RX ORDER — OXYTOCIN 10 [USP'U]/ML
10 INJECTION, SOLUTION INTRAMUSCULAR; INTRAVENOUS
Status: DISCONTINUED | OUTPATIENT
Start: 2020-08-06 | End: 2020-08-11 | Stop reason: HOSPADM

## 2020-08-06 RX ORDER — OXYCODONE AND ACETAMINOPHEN 5; 325 MG/1; MG/1
1 TABLET ORAL
Status: DISCONTINUED | OUTPATIENT
Start: 2020-08-06 | End: 2020-08-06

## 2020-08-06 RX ORDER — MISOPROSTOL 200 UG/1
800 TABLET ORAL
Status: DISCONTINUED | OUTPATIENT
Start: 2020-08-06 | End: 2020-08-11 | Stop reason: HOSPADM

## 2020-08-06 RX ORDER — CITRIC ACID/SODIUM CITRATE 334-500MG
30 SOLUTION, ORAL ORAL
Status: COMPLETED | OUTPATIENT
Start: 2020-08-06 | End: 2020-08-06

## 2020-08-06 RX ORDER — FENTANYL CITRATE 50 UG/ML
INJECTION, SOLUTION INTRAMUSCULAR; INTRAVENOUS PRN
Status: DISCONTINUED | OUTPATIENT
Start: 2020-08-06 | End: 2020-08-06

## 2020-08-06 RX ORDER — SODIUM CHLORIDE, SODIUM LACTATE, POTASSIUM CHLORIDE, CALCIUM CHLORIDE 600; 310; 30; 20 MG/100ML; MG/100ML; MG/100ML; MG/100ML
INJECTION, SOLUTION INTRAVENOUS ONCE
Status: DISCONTINUED | OUTPATIENT
Start: 2020-08-06 | End: 2020-08-11 | Stop reason: HOSPADM

## 2020-08-06 RX ORDER — HYDROMORPHONE HYDROCHLORIDE 1 MG/ML
.3-.5 INJECTION, SOLUTION INTRAMUSCULAR; INTRAVENOUS; SUBCUTANEOUS EVERY 5 MIN PRN
Status: DISCONTINUED | OUTPATIENT
Start: 2020-08-06 | End: 2020-08-06 | Stop reason: HOSPADM

## 2020-08-06 RX ORDER — TRANEXAMIC ACID 10 MG/ML
1 INJECTION, SOLUTION INTRAVENOUS EVERY 30 MIN PRN
Status: DISCONTINUED | OUTPATIENT
Start: 2020-08-06 | End: 2020-08-11 | Stop reason: HOSPADM

## 2020-08-06 RX ORDER — ONDANSETRON 2 MG/ML
4 INJECTION INTRAMUSCULAR; INTRAVENOUS EVERY 30 MIN PRN
Status: DISCONTINUED | OUTPATIENT
Start: 2020-08-06 | End: 2020-08-06 | Stop reason: HOSPADM

## 2020-08-06 RX ORDER — ONDANSETRON 4 MG/1
4 TABLET, ORALLY DISINTEGRATING ORAL EVERY 6 HOURS PRN
Status: DISCONTINUED | OUTPATIENT
Start: 2020-08-06 | End: 2020-08-08

## 2020-08-06 RX ORDER — OXYCODONE HYDROCHLORIDE 5 MG/1
5 TABLET ORAL EVERY 4 HOURS PRN
Status: DISCONTINUED | OUTPATIENT
Start: 2020-08-06 | End: 2020-08-11 | Stop reason: HOSPADM

## 2020-08-06 RX ORDER — ONDANSETRON 2 MG/ML
4 INJECTION INTRAMUSCULAR; INTRAVENOUS EVERY 6 HOURS PRN
Status: DISCONTINUED | OUTPATIENT
Start: 2020-08-06 | End: 2020-08-06

## 2020-08-06 RX ORDER — DEXTROSE, SODIUM CHLORIDE, SODIUM LACTATE, POTASSIUM CHLORIDE, AND CALCIUM CHLORIDE 5; .6; .31; .03; .02 G/100ML; G/100ML; G/100ML; G/100ML; G/100ML
INJECTION, SOLUTION INTRAVENOUS CONTINUOUS
Status: DISCONTINUED | OUTPATIENT
Start: 2020-08-06 | End: 2020-08-11 | Stop reason: HOSPADM

## 2020-08-06 RX ORDER — KETOROLAC TROMETHAMINE 30 MG/ML
30 INJECTION, SOLUTION INTRAMUSCULAR; INTRAVENOUS EVERY 6 HOURS
Status: COMPLETED | OUTPATIENT
Start: 2020-08-07 | End: 2020-08-07

## 2020-08-06 RX ORDER — NALBUPHINE HYDROCHLORIDE 10 MG/ML
2.5-5 INJECTION, SOLUTION INTRAMUSCULAR; INTRAVENOUS; SUBCUTANEOUS EVERY 6 HOURS PRN
Status: DISCONTINUED | OUTPATIENT
Start: 2020-08-06 | End: 2020-08-08

## 2020-08-06 RX ORDER — ACETAMINOPHEN 325 MG/1
650 TABLET ORAL EVERY 4 HOURS PRN
Status: DISCONTINUED | OUTPATIENT
Start: 2020-08-06 | End: 2020-08-06

## 2020-08-06 RX ORDER — HYDROCORTISONE 2.5 %
CREAM (GRAM) TOPICAL 3 TIMES DAILY PRN
Status: DISCONTINUED | OUTPATIENT
Start: 2020-08-06 | End: 2020-08-11 | Stop reason: HOSPADM

## 2020-08-06 RX ORDER — ONDANSETRON 2 MG/ML
4 INJECTION INTRAMUSCULAR; INTRAVENOUS EVERY 6 HOURS PRN
Status: DISCONTINUED | OUTPATIENT
Start: 2020-08-06 | End: 2020-08-08

## 2020-08-06 RX ORDER — TRANEXAMIC ACID 10 MG/ML
1 INJECTION, SOLUTION INTRAVENOUS EVERY 30 MIN PRN
Status: DISCONTINUED | OUTPATIENT
Start: 2020-08-06 | End: 2020-08-08

## 2020-08-06 RX ORDER — MORPHINE SULFATE 1 MG/ML
150 INJECTION, SOLUTION EPIDURAL; INTRATHECAL; INTRAVENOUS ONCE
Status: DISCONTINUED | OUTPATIENT
Start: 2020-08-06 | End: 2020-08-08

## 2020-08-06 RX ORDER — MODIFIED LANOLIN
OINTMENT (GRAM) TOPICAL
Status: DISCONTINUED | OUTPATIENT
Start: 2020-08-06 | End: 2020-08-11 | Stop reason: HOSPADM

## 2020-08-06 RX ORDER — LIDOCAINE 40 MG/G
CREAM TOPICAL
Status: DISCONTINUED | OUTPATIENT
Start: 2020-08-06 | End: 2020-08-06

## 2020-08-06 RX ORDER — ONDANSETRON 2 MG/ML
INJECTION INTRAMUSCULAR; INTRAVENOUS PRN
Status: DISCONTINUED | OUTPATIENT
Start: 2020-08-06 | End: 2020-08-06

## 2020-08-06 RX ORDER — CEFAZOLIN SODIUM 1 G/3ML
1 INJECTION, POWDER, FOR SOLUTION INTRAMUSCULAR; INTRAVENOUS SEE ADMIN INSTRUCTIONS
Status: DISCONTINUED | OUTPATIENT
Start: 2020-08-06 | End: 2020-08-06 | Stop reason: HOSPADM

## 2020-08-06 RX ORDER — OXYTOCIN/0.9 % SODIUM CHLORIDE 30/500 ML
PLASTIC BAG, INJECTION (ML) INTRAVENOUS CONTINUOUS PRN
Status: DISCONTINUED | OUTPATIENT
Start: 2020-08-06 | End: 2020-08-06

## 2020-08-06 RX ORDER — AMOXICILLIN 250 MG
2 CAPSULE ORAL 2 TIMES DAILY
Status: DISCONTINUED | OUTPATIENT
Start: 2020-08-06 | End: 2020-08-11 | Stop reason: HOSPADM

## 2020-08-06 RX ORDER — ACETAMINOPHEN 325 MG/1
975 TABLET ORAL EVERY 6 HOURS
Status: DISCONTINUED | OUTPATIENT
Start: 2020-08-06 | End: 2020-08-11 | Stop reason: HOSPADM

## 2020-08-06 RX ORDER — BISACODYL 10 MG
10 SUPPOSITORY, RECTAL RECTAL DAILY PRN
Status: DISCONTINUED | OUTPATIENT
Start: 2020-08-08 | End: 2020-08-11 | Stop reason: HOSPADM

## 2020-08-06 RX ORDER — SIMETHICONE 80 MG
160 TABLET,CHEWABLE ORAL EVERY 4 HOURS PRN
Status: DISCONTINUED | OUTPATIENT
Start: 2020-08-06 | End: 2020-08-11 | Stop reason: HOSPADM

## 2020-08-06 RX ORDER — METHYLERGONOVINE MALEATE 0.2 MG/ML
200 INJECTION INTRAVENOUS
Status: DISCONTINUED | OUTPATIENT
Start: 2020-08-06 | End: 2020-08-06

## 2020-08-06 RX ORDER — LIDOCAINE 40 MG/G
CREAM TOPICAL
Status: DISCONTINUED | OUTPATIENT
Start: 2020-08-06 | End: 2020-08-11 | Stop reason: HOSPADM

## 2020-08-06 RX ORDER — IBUPROFEN 400 MG/1
800 TABLET, FILM COATED ORAL
Status: DISCONTINUED | OUTPATIENT
Start: 2020-08-06 | End: 2020-08-08

## 2020-08-06 RX ORDER — OXYTOCIN/0.9 % SODIUM CHLORIDE 30/500 ML
75 PLASTIC BAG, INJECTION (ML) INTRAVENOUS CONTINUOUS
Status: DISCONTINUED | OUTPATIENT
Start: 2020-08-06 | End: 2020-08-11 | Stop reason: HOSPADM

## 2020-08-06 RX ORDER — BUPIVACAINE HYDROCHLORIDE 7.5 MG/ML
INJECTION, SOLUTION INTRASPINAL PRN
Status: DISCONTINUED | OUTPATIENT
Start: 2020-08-06 | End: 2020-08-06

## 2020-08-06 RX ORDER — NALOXONE HYDROCHLORIDE 0.4 MG/ML
.1-.4 INJECTION, SOLUTION INTRAMUSCULAR; INTRAVENOUS; SUBCUTANEOUS
Status: DISCONTINUED | OUTPATIENT
Start: 2020-08-06 | End: 2020-08-08

## 2020-08-06 RX ORDER — OXYTOCIN/0.9 % SODIUM CHLORIDE 30/500 ML
340 PLASTIC BAG, INJECTION (ML) INTRAVENOUS CONTINUOUS PRN
Status: DISCONTINUED | OUTPATIENT
Start: 2020-08-06 | End: 2020-08-11 | Stop reason: HOSPADM

## 2020-08-06 RX ORDER — ONDANSETRON 4 MG/1
4 TABLET, ORALLY DISINTEGRATING ORAL EVERY 30 MIN PRN
Status: DISCONTINUED | OUTPATIENT
Start: 2020-08-06 | End: 2020-08-06 | Stop reason: HOSPADM

## 2020-08-06 RX ORDER — BETAMETHASONE SODIUM PHOSPHATE AND BETAMETHASONE ACETATE 3; 3 MG/ML; MG/ML
INJECTION, SUSPENSION INTRA-ARTICULAR; INTRALESIONAL; INTRAMUSCULAR; SOFT TISSUE
Status: DISCONTINUED
Start: 2020-08-06 | End: 2020-08-06 | Stop reason: HOSPADM

## 2020-08-06 RX ADMIN — SODIUM CITRATE AND CITRIC ACID MONOHYDRATE 30 ML: 500; 334 SOLUTION ORAL at 15:50

## 2020-08-06 RX ADMIN — CEFAZOLIN SODIUM 2 G: 2 INJECTION, SOLUTION INTRAVENOUS at 16:15

## 2020-08-06 RX ADMIN — Medication 100 ML/HR: at 20:27

## 2020-08-06 RX ADMIN — ACETAMINOPHEN 975 MG: 325 TABLET, FILM COATED ORAL at 18:23

## 2020-08-06 RX ADMIN — SODIUM CHLORIDE, POTASSIUM CHLORIDE, SODIUM LACTATE AND CALCIUM CHLORIDE: 600; 310; 30; 20 INJECTION, SOLUTION INTRAVENOUS at 15:55

## 2020-08-06 RX ADMIN — DOCUSATE SODIUM AND SENNOSIDES 2 TABLET: 8.6; 5 TABLET, FILM COATED ORAL at 08:25

## 2020-08-06 RX ADMIN — PHENYLEPHRINE HYDROCHLORIDE 0.5 MCG/KG/MIN: 10 INJECTION INTRAVENOUS at 16:17

## 2020-08-06 RX ADMIN — SODIUM CHLORIDE, POTASSIUM CHLORIDE, SODIUM LACTATE AND CALCIUM CHLORIDE 999 ML/HR: 600; 310; 30; 20 INJECTION, SOLUTION INTRAVENOUS at 12:54

## 2020-08-06 RX ADMIN — LABETALOL HYDROCHLORIDE 400 MG: 200 TABLET, FILM COATED ORAL at 08:25

## 2020-08-06 RX ADMIN — MAGNESIUM SULFATE IN WATER 4 G: 40 INJECTION, SOLUTION INTRAVENOUS at 14:57

## 2020-08-06 RX ADMIN — MAGNESIUM SULFATE IN WATER 2 G/HR: 40 INJECTION, SOLUTION INTRAVENOUS at 15:25

## 2020-08-06 RX ADMIN — SODIUM CHLORIDE, SODIUM LACTATE, POTASSIUM CHLORIDE, CALCIUM CHLORIDE AND DEXTROSE MONOHYDRATE: 5; 600; 310; 30; 20 INJECTION, SOLUTION INTRAVENOUS at 18:56

## 2020-08-06 RX ADMIN — FENTANYL CITRATE 20 MCG: 50 INJECTION, SOLUTION INTRAMUSCULAR; INTRAVENOUS at 16:12

## 2020-08-06 RX ADMIN — PRENATAL VITAMINS-IRON FUMARATE 27 MG IRON-FOLIC ACID 0.8 MG TABLET 1 TABLET: at 08:26

## 2020-08-06 RX ADMIN — LABETALOL HYDROCHLORIDE 300 MG: 200 TABLET, FILM COATED ORAL at 00:03

## 2020-08-06 RX ADMIN — BETAMETHASONE SODIUM PHOSPHATE AND BETAMETHASONE ACETATE 12 MG: 3; 3 INJECTION, SUSPENSION INTRA-ARTICULAR; INTRALESIONAL; INTRAMUSCULAR at 15:35

## 2020-08-06 RX ADMIN — BUPIVACAINE HYDROCHLORIDE IN DEXTROSE 12 MG: 7.5 INJECTION, SOLUTION SUBARACHNOID at 16:12

## 2020-08-06 RX ADMIN — ONDANSETRON 4 MG: 2 INJECTION INTRAMUSCULAR; INTRAVENOUS at 16:30

## 2020-08-06 RX ADMIN — MORPHINE SULFATE 0.15 MG: 1 INJECTION, SOLUTION EPIDURAL; INTRATHECAL; INTRAVENOUS at 16:12

## 2020-08-06 RX ADMIN — Medication 340 ML/HR: at 16:29

## 2020-08-06 NOTE — BRIEF OP NOTE
[facility] Brief Operative Note    Pre-operative diagnosis: IUP @ 31+4  Maternal gestational hypertension, severe  Non reassuring fetal status remote from delivery   Post-operative diagnosis Same   Procedure: Primary low transverse  section   Surgeon(s): Surgeon(s) and Role:     * Mary Pettit MD - Primary     * Kwaku Choi MD - Assisting   Estimated blood loss: 200cc    Specimens: Cord gases - lab  Placenta - pathology   Findings: Viable male infant, vertex presentation, vigorous on delivery  Normal female PP anatomy    Mary Pettit MD

## 2020-08-06 NOTE — PROVIDER NOTIFICATION
08/06/20 1229 08/06/20 1234   Provider Notification   Provider Name/Title Natacha Pettit   Method of Notification At Bedside At Bedside   Request Evaluate in Person Evaluate in Person   Notification Reason Decels;Fetal Baseline Change;Lab/Diagnostic Study;Status Update Decels;Fetal Baseline Change;Variability Change;Maternal Vital Sign Change;Lab/Diagnostic Study;Status Update

## 2020-08-06 NOTE — PROGRESS NOTES
See Imaging tab for details of today's US.     Cephalic, BPP 6/8 (baby lethargic with no tone), UA Doppler with reversal of end diastole.      Findings are concerning for impending FGR/and or current fetal compromise due to placental insufficiency. With this finding and review of the fetal tracing showing minimal variability and occasional late decels, delivery is recommended. IOL could be attempted, but patient is aware of the higher risk for  delivery especially given UA Dopplers.     Patient had a mild HA yesterday. denies visual changes, RUQ or epigastric pain. +FM, but quieter.  Mild range BPs  Gen: NAD, alert, comfortable  Abd: gravid, non tender  Ext: trace edema, brisk reflexes with 1-2 beats of clonus.     Discussed with Dr. Pettit. Please send placental pathology.     *Recommend antiphospholipid antibody testing at postpartum visit (Anticardiolipin IgG and IgM, Beta 2 glycoprotein IgG and IgM, Lupus anticoagulant). We would be happy  to see Conchita in the future for a preconception consultation.     I spent a total of 15 minutes face-to-face with Conchita Reich during today's office visit. Over 50% of this time was spent counseling the patient and/or coordinating care regarding plan for delivery given change in fetal status in the setting of preE with severe features. See note for details.    Dora Manzano DO FACOG  Maternal Fetal Medicine Specialist  Pager: [phone]  Mobile: [phone]

## 2020-08-06 NOTE — PROVIDER NOTIFICATION
08/06/20 1229 08/06/20 1234 08/06/20 1250   Provider Notification   Provider Name/Title Natacha Pettit    Method of Notification At Bedside At Bedside At Bedside   Request Evaluate in Person Evaluate in Person Evaluate in Person   Notification Reason Decels;Fetal Baseline Change;Lab/Diagnostic Study;Status Update Decels;Fetal Baseline Change;Variability Change;Maternal Vital Sign Change;Lab/Diagnostic Study;Status Update Decels;Fetal Baseline Change;Variability Change;Lab/Diagnostic Study

## 2020-08-06 NOTE — PROVIDER NOTIFICATION
08/06/20 1250   Fetal Assessment   Fetal HR Assessment Method external US   Fetal HR (beats/min) 120   Fetal HR Decelerations prolonged   Membranes   Membrane Status Intact   Vaginal Bleeding   Vaginal Bleeding not present   Pain/Comfort   Patient Currently in Pain denies   Labor Care   Activity In Labor bedrest   Change of Maternal Position left side   Support At Bedside spouse   Trust Relationship/Rapport care explained;choices provided;emotional support provided;empathic listening provided;questions answered;questions encouraged;reassurance provided;thoughts/feelings acknowledged   Labor Care Interventions   Labor Interventions fetal heart rate monitored;intravenous fluid bolus administered   Provider Notification   Provider Name/Title Pettit    Method of Notification At Bedside   Request Evaluate in Person   Notification Reason Decels;Fetal Baseline Change;Variability Change;Lab/Diagnostic Study

## 2020-08-06 NOTE — ANESTHESIA PROCEDURE NOTES
Procedure note : intrathecal  Staff -   Anesthesiologist:  Amaury Otoole MD      Performed By: Anesthesiologist and anesthesiologist        Pre-Procedure  Performed by Amaury Otoole MD  Location: OR      Pre-Anesthestic Checklist: patient identified, IV checked, risks and benefits discussed, informed consent, monitors and equipment checked and pre-op evaluation    Timeout  Correct Patient: Yes   Correct Procedure: Yes   Correct Site: Yes   Correct Laterality: N/A   Correct Position: Yes   Site Marked: N/A   .   Procedure Documentation    .    Procedure: intrathecal, .   Patient Position:sitting Insertion Site:L3-4  (midline approach)     Patient Prep/Sterile Barriers; mask, sterile gloves, povidone-iodine 7.5% surgical scrub, patient draped.  .  Needle:  Spinal Needle (gauge): 25  Spinal/LP Needle Length (inches): 3.5 # of attempts: 1 and # of redirects: : 0. Introducer used Introducer: 20 G .        Assessment/Narrative  Paresthesias: No.  .  .  clear CSF fluid removed . Comments:  Clear CSF first pass.  Free flow pre and post injection.  No abnormal pain or paresthesia throughout.  Patient tolerated well.     No complications.

## 2020-08-06 NOTE — PLAN OF CARE
1235 [facility] completed ultrasound BPP 6/8  1250 had prolonged decel Dr Pettit present  IV bolus and position change resolved.  1310  Dr Manzano in room recomends cervical ripening.  1430 Moved to room 219  Mag started per order strip not reactive will give cervadil when reactive.  1520 had prolonged decel dr Pettit updated   Corby Pettit at bedside decision for c section.  1600 ready for OR transported in bed.

## 2020-08-06 NOTE — PROGRESS NOTES
"OB Antepartum Note - Hospital Day 13    S:  Patient is doing well. No contractions.  No vaginal bleeding.  Baby active.  No LOF.  Bowels moving, though did have some blood in her stool the past 2 days.  Tolerating diet.  Mild headache last night, resolved. No blurred vision or RUQ pain.    O:  BP (!) 154/98   Pulse 74   Temp 97.7  F (36.5  C) (Temporal)   Resp 16   Ht 1.778 m (5' 10\")   Wt 81.2 kg (179 lb)   LMP 2019   SpO2 96%   BMI 25.68 kg/m    Gen-A&O, NAD  Abd- Gravid, non-tender  EFM-  Baseline 120, accels present, moderate variability, variable decelerations  [place]- none  Cervix- deferred  Extr - 2+ DTRs, one beat clonus bilaterally, no edema.     Labs:   Lab Results   Component Value Date    CR 1.05 (H) 2020    CR 1.06 (H) 2020    CR 1.07 (H) 2020     Lab Results   Component Value Date    HGB 12.5 2020    HGB 12.8 2020    HGB 13.0 2020     Lab Results   Component Value Date    AST 17 2020    AST 22 2020    AST 17 2020    ALT 22 2020    ALT 25 2020    ALT 25 2020    ALKPHOS 115 2020    ALKPHOS 126 2020    ALKPHOS 124 2020    BILITOTAL 0.2 2020    BILITOTAL 0.2 2020    BILITOTAL 0.2 2020     Urine P/C ratio 0.14 g/g on 2020    A/P: 29 year old  @ 31w2d HD# 12 admitted with gestational htn. BPs controlled on po labetalol tid.  Stable clinical status without evidence of progressive pre-E or HELLP syndrome.      1.  Continue expectant inpatient management and if maternal and fetal status remain stable plan for IOL at 34 weeks. Discussed case with Dr. Manzano who recommends ongoing inpatient management despite stable bps on current regimen. Specifically reviewed h/o elevated blood pressures on OCPs in the past, but pt was normotensive in pregnancy prior to this spike in bp and does not meet criteria for CHTN.  2.   Continue Labetalol at the current dosage.  400 mg orally in the am, 300 " mg orally in the afternoon and evening.  If blood pressure is  > 155/105 on consecutive checks, further intervention/alteration in management to be considered.   3. Daily weights, I/Os, plan to change labs to every other day unless patient develops new symptoms. Repeat proteinuria assessment weekly.  4. S/p IV MagSO4 for 48 hrs for neuroprotection on initial admission; if pt require induction due to blood pressures in severe range Mg++ will be ordered for maternal seizure prophylaxis.  5. S/p BMZ July 24-25  6. Growth US q2-3 weeks, last done 7/27/20, cephalic on last ultrasound.   7. NST tid, twice weekly BPPs (Monday/Thrusday)     Mary Pettit MD  8/6/2020  8:40 AM

## 2020-08-06 NOTE — PROGRESS NOTES
BPP 6/8 with reverse end diastolic flow, NST non reactive and prolonged deceleration to 90s x 8 minutes. Plan of care reviewed with Dr. Manzano who recommend proceeding with IOL and giving course of rescue steroids concurrently.    S: Pt feeling well. Denies HA, blurry vision, RUQ pain.    O:   Vitals:    20 1950 20 0000 20 0414 20 0825   BP: 135/82 138/80 130/74 (!) 154/98   Pulse:       Resp:  16     Temp: 98.4  F (36.9  C) 99  F (37.2  C) 97.7  F (36.5  C)    TempSrc: Temporal Temporal Temporal    SpO2:       Weight:       Height:       I/O - 500/650 today    NAD  LCTAB  RRR  Abd - soft, non-tender  SVE - deferred  2+DTRs, 1-beat clonus bilaterally  FHT: 125, moderate variability, no accels, prolonged decel to 90s x 8 minutes resolved with O2 and position change, category 2.     Vtx on US today (BPP 6/8 by MFM). Most recent EFW 3+7 on .    Assessment: 29 year old  @ 31w2d HD# 12 admitted with gestational htn. BPs controlled on po labetalol tid.  Fetal status concerning with reverse EDF, 6/10 BPP and intermittent late and one prolonged deceration. S/p BMZ  & . GBS negative .     Plan:  - IOL with cervidil  - Rescue dose steroids  - Continuous monitoring  - Patient consented for  in case necessary due to fetal intolerance. Patient consented for  section. Risk, benefits, alternatives discussed with patient to include, but not limited to bleeding (blood transfusion risk 1 in 2 million HIV and 1 in 250K Hep C), infection, damage to surrounding organs,  hysterectomy, fetal injury. All questions answered.  - Update labs, type & screen  - Magnesium sulfate for seizure ppx, strict I/Os  - Monitor bps closely, treat for severe pressures.  - Anticipate   - GBS negative 20  - Plan preconception consult with MFM after 12 weeks PP and consider APAS workup.    Mary Pettit MD

## 2020-08-06 NOTE — PROGRESS NOTES
Called to see patient due to deceleration. Reviewed strip showing baseline 120, moderate variability, +10x10 accels, one decel to 100 x 8 minutes at 15:22. Patient has not yet received cervidil. Recommend proceeding immediately with  delivery due to recurrent decelerations remote from delivery.    Mary Pettit MD

## 2020-08-06 NOTE — ANESTHESIA PREPROCEDURE EVALUATION
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 Pt has had prior anesthetic.     No history of anesthetic complications          ROS/MED HX    ENT/Pulmonary:  - neg pulmonary ROS     Neurologic:  - neg neurologic ROS     Cardiovascular: Comment: Gestational HTN/pre ecclampsia        METS/Exercise Tolerance:  >4 METS   Hematologic:         Musculoskeletal:         GI/Hepatic:  - neg GI/hepatic ROS       Renal/Genitourinary:      (-) renal disease   Endo:         Psychiatric:         Infectious Disease:         Malignancy:         Other:                          Physical Exam      Airway   Mallampati: I  TM distance: >3 FB  Neck ROM: full    Dental     Cardiovascular   Rhythm and rate: regular and normal      Pulmonary    breath sounds clear to auscultation            Lab Results   Component Value Date    WBC 10.0 2020    HGB 12.8 2020    HCT 38.8 2020     2020     2020    POTASSIUM 4.1 2020    CHLORIDE 109 2020    CO2 20 2020    BUN 17 2020    CR 1.10 (H) 2020    GLC 71 2020    SHERRIE 8.4 (L) 2020    MAG 4.6 (H) 2020    ALBUMIN 2.6 (L) 2020    PROTTOTAL 5.8 (L) 2020    ALT 23 2020    AST 19 2020    ALKPHOS 115 2020    BILITOTAL 0.2 2020    LIPASE 135 2020    INR 0.84 (L) 2020  "   FIBR 401 08/06/2020    TSH 1.23 03/25/2016    T4 0.99 03/25/2016       Preop Vitals  BP Readings from Last 3 Encounters:   08/06/20 (!) 154/98   02/22/20 (!) 139/98   04/19/16 134/76    Pulse Readings from Last 3 Encounters:   08/03/20 74   02/22/20 76   04/19/16 84      Resp Readings from Last 3 Encounters:   08/06/20 20   02/22/20 15    SpO2 Readings from Last 3 Encounters:   07/25/20 96%   02/22/20 96%   04/19/16 98%      Temp Readings from Last 1 Encounters:   08/06/20 36.5  C (97.7  F) (Temporal)    Ht Readings from Last 1 Encounters:   07/24/20 1.778 m (5' 10\")      Wt Readings from Last 1 Encounters:   08/05/20 81.2 kg (179 lb)    Estimated body mass index is 25.68 kg/m  as calculated from the following:    Height as of this encounter: 1.778 m (5' 10\").    Weight as of this encounter: 81.2 kg (179 lb).       Anesthesia Plan      History & Physical Review  History and physical reviewed and following examination; no interval change.    ASA Status:  2 .        Plan for Spinal            Postoperative Care      Consents  Anesthetic plan, risks, benefits and alternatives discussed with:  Patient and Spouse.  Use of blood products discussed: Yes.   Use of blood products discussed with Patient and Spouse.  Consented to blood products.  .                 Amaury Otoole MD  "

## 2020-08-07 LAB
ALBUMIN SERPL-MCNC: 2.7 G/DL (ref 3.4–5)
ALP SERPL-CCNC: 135 U/L (ref 40–150)
ALT SERPL W P-5'-P-CCNC: 30 U/L (ref 0–50)
ANION GAP SERPL CALCULATED.3IONS-SCNC: 6 MMOL/L (ref 3–14)
AST SERPL W P-5'-P-CCNC: 30 U/L (ref 0–45)
BILIRUB SERPL-MCNC: 0.2 MG/DL (ref 0.2–1.3)
BUN SERPL-MCNC: 16 MG/DL (ref 7–30)
CALCIUM SERPL-MCNC: 7.6 MG/DL (ref 8.5–10.1)
CHLORIDE SERPL-SCNC: 106 MMOL/L (ref 94–109)
CO2 SERPL-SCNC: 23 MMOL/L (ref 20–32)
CREAT SERPL-MCNC: 1.09 MG/DL (ref 0.52–1.04)
ERYTHROCYTE [DISTWIDTH] IN BLOOD BY AUTOMATED COUNT: 13.4 % (ref 10–15)
GFR SERPL CREATININE-BSD FRML MDRD: 68 ML/MIN/{1.73_M2}
GLUCOSE SERPL-MCNC: 113 MG/DL (ref 70–99)
HCT VFR BLD AUTO: 37.7 % (ref 35–47)
HGB BLD-MCNC: 12.6 G/DL (ref 11.7–15.7)
MAGNESIUM SERPL-MCNC: 7.4 MG/DL (ref 1.6–2.3)
MAGNESIUM SERPL-MCNC: 8 MG/DL (ref 1.6–2.3)
MCH RBC QN AUTO: 30.5 PG (ref 26.5–33)
MCHC RBC AUTO-ENTMCNC: 33.4 G/DL (ref 31.5–36.5)
MCV RBC AUTO: 91 FL (ref 78–100)
PLATELET # BLD AUTO: 196 10E9/L (ref 150–450)
POTASSIUM SERPL-SCNC: 4.3 MMOL/L (ref 3.4–5.3)
PROT SERPL-MCNC: 6.2 G/DL (ref 6.8–8.8)
RBC # BLD AUTO: 4.13 10E12/L (ref 3.8–5.2)
SODIUM SERPL-SCNC: 135 MMOL/L (ref 133–144)
T PALLIDUM AB SER QL: NONREACTIVE
WBC # BLD AUTO: 18.6 10E9/L (ref 4–11)

## 2020-08-07 PROCEDURE — 83735 ASSAY OF MAGNESIUM: CPT | Performed by: OBSTETRICS & GYNECOLOGY

## 2020-08-07 PROCEDURE — 25000128 H RX IP 250 OP 636: Performed by: OBSTETRICS & GYNECOLOGY

## 2020-08-07 PROCEDURE — 36415 COLL VENOUS BLD VENIPUNCTURE: CPT | Performed by: OBSTETRICS & GYNECOLOGY

## 2020-08-07 PROCEDURE — 80053 COMPREHEN METABOLIC PANEL: CPT | Performed by: OBSTETRICS & GYNECOLOGY

## 2020-08-07 PROCEDURE — 12000035 ZZH R&B POSTPARTUM

## 2020-08-07 PROCEDURE — 25000132 ZZH RX MED GY IP 250 OP 250 PS 637: Performed by: OBSTETRICS & GYNECOLOGY

## 2020-08-07 PROCEDURE — 85027 COMPLETE CBC AUTOMATED: CPT | Performed by: OBSTETRICS & GYNECOLOGY

## 2020-08-07 PROCEDURE — 25800030 ZZH RX IP 258 OP 636: Performed by: OBSTETRICS & GYNECOLOGY

## 2020-08-07 RX ADMIN — ACETAMINOPHEN 975 MG: 325 TABLET, FILM COATED ORAL at 12:05

## 2020-08-07 RX ADMIN — SODIUM CHLORIDE, POTASSIUM CHLORIDE, SODIUM LACTATE AND CALCIUM CHLORIDE 87.5 ML/HR: 600; 310; 30; 20 INJECTION, SOLUTION INTRAVENOUS at 14:19

## 2020-08-07 RX ADMIN — ACETAMINOPHEN 975 MG: 325 TABLET, FILM COATED ORAL at 06:30

## 2020-08-07 RX ADMIN — KETOROLAC TROMETHAMINE 30 MG: 30 INJECTION, SOLUTION INTRAMUSCULAR at 06:31

## 2020-08-07 RX ADMIN — ACETAMINOPHEN 975 MG: 325 TABLET, FILM COATED ORAL at 00:28

## 2020-08-07 RX ADMIN — MAGNESIUM SULFATE IN WATER 1.5 G/HR: 40 INJECTION, SOLUTION INTRAVENOUS at 11:39

## 2020-08-07 RX ADMIN — DOCUSATE SODIUM 50 MG AND SENNOSIDES 8.6 MG 1 TABLET: 8.6; 5 TABLET, FILM COATED ORAL at 08:32

## 2020-08-07 RX ADMIN — SODIUM CHLORIDE, POTASSIUM CHLORIDE, SODIUM LACTATE AND CALCIUM CHLORIDE 75 ML/HR: 600; 310; 30; 20 INJECTION, SOLUTION INTRAVENOUS at 02:06

## 2020-08-07 RX ADMIN — MAGNESIUM SULFATE IN WATER 2 G/HR: 40 INJECTION, SOLUTION INTRAVENOUS at 00:48

## 2020-08-07 RX ADMIN — IBUPROFEN 800 MG: 400 TABLET ORAL at 18:02

## 2020-08-07 RX ADMIN — ACETAMINOPHEN 975 MG: 325 TABLET, FILM COATED ORAL at 18:02

## 2020-08-07 RX ADMIN — KETOROLAC TROMETHAMINE 30 MG: 30 INJECTION, SOLUTION INTRAMUSCULAR at 00:28

## 2020-08-07 RX ADMIN — PRENATAL VITAMINS-IRON FUMARATE 27 MG IRON-FOLIC ACID 0.8 MG TABLET 1 TABLET: at 08:32

## 2020-08-07 RX ADMIN — KETOROLAC TROMETHAMINE 30 MG: 30 INJECTION, SOLUTION INTRAMUSCULAR at 12:05

## 2020-08-07 NOTE — LACTATION NOTE
Initial visit with Conchita.  Baby in NICU 31+4 gestation.    Breastfeeding general information reviewed.   Advised to pump 8-12x/day.  Explained benefits of holding and skin to skin.  Encouraged lots of skin to skin when baby available.  Instructed on hand expression.   Yielding gtts.. No further questions at this time.   Will follow as needed.   Fely FRIEDN, RN, PHN, RNC-MNN, IBCLC

## 2020-08-07 NOTE — PLAN OF CARE
Data: Conchita Boss transferred to room 401 via bed at 1945. Baby remains in NICU.  Action: Receiving unit notified of transfer: Yes. Patient and family notified of room change. Report given to Deanna Reno and Alise TAYLOR RN at 1945. Belongings sent to receiving unit. Accompanied by Registered Nurse. Oriented patient to surroundings. Call light within reach. ID bands double-checked with receiving RN.  Response: Patient tolerated transfer and is stable.

## 2020-08-07 NOTE — OP NOTE
Procedure Date: 2020      PREOPERATIVE DIAGNOSES:   1.  Intrauterine pregnancy at 34 weeks and 1 day gestation.   2.  Maternal preeclampsia with severe features.   3.  Non-reassuring fetal status, remote from delivery.      POSTOPERATIVE DIAGNOSES:     1.  Intrauterine pregnancy at 34 weeks and 1 day gestation.   2.  Maternal preeclampsia with severe features.   3.  Non-reassuring fetal status, remote from delivery.      PROCEDURE:  Primary low transverse  section.      SURGEON:  Mary Pettit MD.      FIRST ASSISTANT:  Elias Choi MD.      ANESTHESIA:  Spinal.      URINE OUTPUT AND  IV FLUIDS:  Per anesthesia.      ESTIMATED BLOOD LOSS:  600 mL (quantitative pending).      PACKS AND DRAINS:  Naik catheter to gravity.      SPECIMENS REMOVED:  Placenta to pathology and cord gases to lab.      COMPLICATIONS:  None.      INDICATIONS FOR PROCEDURE:  Conchita Boss is a very pleasant 29-year-old female,  1, now para 0-1-0-1, who had been admitted to our Antepartum Service for the previous 13 days due to gestational hypertension with severe features.  She previously received magnesium sulfate and betamethasone on  and .  Blood pressures were controlled with oral labetalol 400 mg in the morning and 300 orally in the afternoon and evening.  Laboratory evaluation was notable for a slightly elevated creatinine, but not twice normal for the patient.  AST, ALT and platelets remained normal throughout her hospital stay.  Urine protein creatinine ratio was normal on multiple checks.  Day of delivery, the patient had an evaluation including a biophysical profile by Maternal Fetal Medicine that resulted in a 6 out of 8.  Dopplers were performed demonstrating reverse end-diastolic flow.  NST was performed and a fetal deceleration to the 90s for 8 minutes was noted.  Dr. Manzano recommended delivery.  I anticipated an option of cervical ripening.  However, between this evaluation at around noon and  prior to placement of Cervidil in the afternoon, another deceleration occurred for 8 minutes.  The decision was made to proceed with delivery via  section due to inability to induce for fetal status.  All appropriate consent forms have been signed.      FINDINGS AND DESCRIPTION:   1.  Viable male infant, weight and Apgars pending.   2.  Normal female postpartum anatomy.     3.  Male infant was in the vertex presentation.      DESCRIPTION OF PROCEDURE:  The patient was taken to the operating room where spinal anesthesia was found to be adequate.  The patient was prepped and draped in normal sterile fashion in the supine position with leftward tilt.  Timeout was performed confirming the patient and procedure.  First, a Pfannenstiel skin incision was made with a knife and carried down to the underlying fascia with a Bovie.  At this time, Dr. Elias Choi was scrubbed in and present for the remainder of the surgery to assist with this very  delivery.  The fascia was then incised in the midline and extended laterally with Owens scissors.  The fascia was grasped at the superior aspect with Kocher clamps x 2 and elevated off the rectus muscles, which were dissected off sharply and bluntly.  Attention was turned to the inferior fascia, which was grasped with Kocher clamps x 2 and elevated off the rectus muscles as well which were dissected off sharply and bluntly.  Rectus muscles were  high in the midline and the peritoneum entered sharply with Metzenbaum scissors.  Rectus muscles and peritoneum were carried down carefully in layers inferiorly.  A bladder blade was placed in the vesicouterine fold and a bladder flap created sharply with Metzenbaum scissors.      After confirming that adequate visualization, adequate exposure, a low transverse incision was made with a knife.  Entry into the uterine cavity was made bluntly through the anterior placenta.  Delivery of the fetal head was around the placenta  and uncomplicated.  The infant had cry upon delivery and delayed cord clamping was performed times approximately 45 seconds.  At this time, it was terminated as the placenta had partially delivered and it was felt this was of less value.  The infant was then handed off to the waiting  nurse practitioners and NICU staff.  Cord gases were collected.  Cord blood was collected as well.  The placenta was delivered manually and intact.  It was sent to lab.      Uterus was exteriorized and cleared of all clots and debris.  The myotomy was repaired with 0 Vicryl in a running locked fashion.  Monocryl 4-0 was used in an imbricating fashion for excellent closure and hemostasis.  The uterus was replaced into the pelvis and hemostasis was confirmed.  The rectus muscles and fascia were examined and found to be hemostatic.  The fascia was closed with 0 Vicryl in a running fashion.  Subcutaneous tissue was irrigated and made hemostatic locally and any oozing areas with the Bovie.  The subcutaneous tissue was reapproximated with 3-0 Vicryl in a running fashion.  The skin was closed with 4-0 Monocryl in a subcuticular fashion.      The patient tolerated the procedure well.  All sponge, needle and instrument counts were correct x 3.  The patient was taken to the recovery room in stable condition.         WALDO MARIO MD             D: 2020   T: 2020   MT:       Name:     FILOMENA GROSS   MRN:      [id number]        Account:        [id number]   :      1990           Procedure Date: 2020      Document: [id number]

## 2020-08-07 NOTE — PROVIDER NOTIFICATION
Spoke with Dr. Choi to clarify patient's IV fluid orders. Orders received to decrease Pitocin rate to 75 mL/hr to have a total intake of 125 mL/hr. MD also ordered a magnesium level and CBC with platelets to be drawn at 0600.

## 2020-08-07 NOTE — PLAN OF CARE
Received report via phone by JAYDEN Almonte. Patient admitted to Postpartum via bed. Patient accompanied by JAYDEN Dietrich and arrived with personal belongings. Patient is alert and orientated. Fundus is firm and midline. Vaginal bleeding is scant. Naik patent and draining. Dressing to lower abdomen. Dressing was marked. Patient orientated to the room and call light system. Educated patient on frequent vital signs and fundal checks.

## 2020-08-07 NOTE — ANESTHESIA POSTPROCEDURE EVALUATION
Patient: Conchita Boss    Procedure(s):   SECTION    Diagnosis:Indication for care in labor or delivery [O75.9]  Diagnosis Additional Information: No value filed.    Anesthesia Type:  Spinal    Note:  Anesthesia Post Evaluation    Patient location during evaluation: OB PACU  Patient participation: Able to participate in evaluation but full recovery from regional anesthesia has not yet ocurrred but is anticipated to occur within 48 hours  Level of consciousness: awake and alert  Pain management: adequate  Airway patency: patent  Cardiovascular status: acceptable  Respiratory status: acceptable  Hydration status: acceptable  PONV: controlled     Anesthetic complications: None          Last vitals:  Vitals:    20 1900 20 1945 20   BP: (!) 130/90 129/88 134/85   Pulse:  56 54   Resp:  18 16   Temp:      SpO2:  99% 99%         Electronically Signed By: Saira Akers MD, MD  2020  9:23 PM

## 2020-08-07 NOTE — PLAN OF CARE
"Blood pressures running 120s-130s/80s-90s. Utilizing tylenol and toradol for pain management. Denies headache, blurred vision, nausea and epigastric pain. Patient has brisk reflexes with no clonus. Patient reports feeling slightly light-headed and \"shaky\". Fundus is firm and midline. Vaginal bleeding is scant. Adequate intake and output. Patient advanced to regular diet overnight. Educated mom on pumping every 3 hours. Spouse is present at bedside, supportive and involved in care. Mother visited  in NICU. Will continue to monitor and notify MD as needed.   "

## 2020-08-07 NOTE — PROVIDER NOTIFICATION
08/07/20 1026   Provider Notification   Provider Name/Title Dr. Gray   Method of Notification Phone   Request Evaluate-Remote   Notification Reason Lab Results   Critical lab value, mag level 7.4 called to Dr. Gray. Order received to continue mag at 1.5gm/hr until 24 hrs. Discontinue  mag at 24 hrs. Order received, patient able to visit infant in NICU

## 2020-08-07 NOTE — PROVIDER NOTIFICATION
Updated Dr. Choi on critical magnesium result of 8.0. Orders received to decrease Magnesium Sulfate to 1.5 g/hr and recheck a magnesium level at 1000.

## 2020-08-07 NOTE — PROGRESS NOTES
Post-partum Note      S: Patient is doing well today.  Pain is controlled with PO medications.  Tolerating regular diet without nausea or vomiting. Some dizziness and blurry vision currently, villalba in place. Baby in NICU, would like to go visit. Lochia normal.  Pumping for baby Jesus Alberto (STEFANY). Doing well, getting between 2-6oz per pump.No headache/chest pain/shortness of breath.       O:   Patient Vitals for the past 24 hrs:   BP Temp Temp src Pulse Heart Rate Resp SpO2   08/07/20 0329 124/84 96.2  F (35.7  C) Temporal 82 -- 16 96 %   08/07/20 0025 133/88 96.3  F (35.7  C) Temporal 76 -- 16 96 %   08/06/20 2330 (!) 130/90 96  F (35.6  C) Temporal 69 -- 14 98 %   08/06/20 2216 (!) 131/92 -- -- 62 -- 16 98 %   08/06/20 2115 133/89 -- -- 56 -- 16 98 %   08/06/20 2015 134/85 -- -- 54 -- 16 99 %   08/06/20 1945 129/88 -- -- 56 -- 18 99 %   08/06/20 1900 (!) 130/90 -- -- -- -- -- --   08/06/20 1845 131/85 -- -- -- -- -- --   08/06/20 1830 127/84 -- -- -- 62 -- 98 %   08/06/20 1815 125/80 -- -- -- 67 16 97 %   08/06/20 1800 123/80 -- -- -- 69 16 97 %   08/06/20 1755 127/82 -- -- -- 68 16 97 %   08/06/20 1740 122/80 -- -- -- 63 16 96 %   08/06/20 1725 122/68 -- -- -- 64 16 97 %   08/06/20 1710 117/73 97.3  F (36.3  C) Axillary -- 64 16 97 %   08/06/20 1351 -- 96.6  F (35.9  C) -- -- -- 20 --   08/06/20 1234 (!) 144/90 -- -- -- -- -- --   08/06/20 1229 (!) 156/98 -- -- -- -- -- --   08/06/20 0825 (!) 154/98 -- -- -- -- -- --     Gen:  Resting comfortably, NAD  Pulm:  Breathing comfortably on room air, CTAB  CV: RRR  Abd:  Soft, appropriately ttp, non-distended.Fundus at umbilicus, firm and non-tender.  Incision: intact with aquacel dressing with old serosanguinous drainage, will remove on day of discharge   Ext:  non-tender, trace bilateral LE edema, 2 beats clonus bilaterally, +3 reflexes.     I/O last 3 completed shifts:  In: 3387 [P.O.:1350; I.V.:2037]  Out: 2830 [Urine:2650; Blood:180]    Hgb:     Hemoglobin   Date  Value Ref Range Status   08/07/2020 12.6 11.7 - 15.7 g/dL Final   08/06/2020 12.8 11.7 - 15.7 g/dL Final       Assessment/Plan:  29 year old G1 now  who is POD1 after pLTCS at 31w5d for gestational HTN with severe features (on labetalol TID for blood pressures) and non reassuring fetal status (BPP 6/8 with REDF on doppler) and spontaneous decelerations on tracing.     1. Continue with routine postpartum management  2. Incision is c/d/I with suture  3. EBL: 600ml ; pre hemoglobin 12.8, post hemogobin 12.6 no symptoms of anemia.   4.   Lab Results   Component Value Date    ABO AB 08/06/2020    RH Pos 08/06/2020    , Rubella immune  5. Feed: pumping for baby in NICU  6. CV/RESP:   -severe GHTN: labs normal except for elevated CRT. On mag for sz ppx. -150s/80-90s in past 24 hours. No acute treatment. Naik in place, 1300 ml UOP/4hrs, net -9L since admit. On labetalol 400 mg in am, 300/300 in afternoon/evening. Bp controlled well. No sx of preeclampsia  -mag was 8 this am at 6, turned mag down to 1.5, rpt level pending at 10 am. No signs of toxicity  -IS Q 2 hours   7. DVT PPX: Scd    Dispo: 24 hours mag post partum, recheck mag level at 10 am. Watch BP and for diuresis.     MD Moy Bond OB/GYN  8/7/2020, 8:14 AM

## 2020-08-07 NOTE — PROGRESS NOTES
SPIRITUAL HEALTH SERVICES  SPIRITUAL ASSESSMENT Progress Note  [facility]     REFERRAL SOURCE: Follow Up    I shared a follow up visit with patient Conchita today, known to me from a previous visit. She and her partner reflected on their last few days, sharing that it didn't go as expected but that they are grateful for how well it went and how well their son is doing. They shared they are both doing well and feeling grateful for the care here.     Conchita shared interest in guided meditation, which I engaged her in as well and a gratitude meditation at the end.     I offered emotional support through reflective listening and words of encouragement.     PLAN: I will continue to follow during patient's hospital stay.     Juliet Bowman  Associate    Phone: [phone]  Pager: [phone]

## 2020-08-08 PROCEDURE — 25000132 ZZH RX MED GY IP 250 OP 250 PS 637: Performed by: OBSTETRICS & GYNECOLOGY

## 2020-08-08 PROCEDURE — 12000035 ZZH R&B POSTPARTUM

## 2020-08-08 RX ORDER — LABETALOL 100 MG/1
300 TABLET, FILM COATED ORAL EVERY 8 HOURS SCHEDULED
Status: DISCONTINUED | OUTPATIENT
Start: 2020-08-08 | End: 2020-08-11 | Stop reason: HOSPADM

## 2020-08-08 RX ADMIN — LABETALOL HYDROCHLORIDE 300 MG: 100 TABLET, FILM COATED ORAL at 15:17

## 2020-08-08 RX ADMIN — DOCUSATE SODIUM 50 MG AND SENNOSIDES 8.6 MG 1 TABLET: 8.6; 5 TABLET, FILM COATED ORAL at 00:02

## 2020-08-08 RX ADMIN — PRENATAL VITAMINS-IRON FUMARATE 27 MG IRON-FOLIC ACID 0.8 MG TABLET 1 TABLET: at 08:25

## 2020-08-08 RX ADMIN — DOCUSATE SODIUM 50 MG AND SENNOSIDES 8.6 MG 2 TABLET: 8.6; 5 TABLET, FILM COATED ORAL at 08:25

## 2020-08-08 RX ADMIN — ACETAMINOPHEN 975 MG: 325 TABLET, FILM COATED ORAL at 12:09

## 2020-08-08 RX ADMIN — LABETALOL HYDROCHLORIDE 300 MG: 100 TABLET, FILM COATED ORAL at 09:03

## 2020-08-08 RX ADMIN — IBUPROFEN 800 MG: 400 TABLET ORAL at 06:02

## 2020-08-08 RX ADMIN — ACETAMINOPHEN 975 MG: 325 TABLET, FILM COATED ORAL at 18:06

## 2020-08-08 RX ADMIN — IBUPROFEN 800 MG: 400 TABLET ORAL at 18:06

## 2020-08-08 RX ADMIN — IBUPROFEN 800 MG: 400 TABLET ORAL at 12:09

## 2020-08-08 RX ADMIN — ACETAMINOPHEN 975 MG: 325 TABLET, FILM COATED ORAL at 06:02

## 2020-08-08 RX ADMIN — IBUPROFEN 800 MG: 400 TABLET ORAL at 00:02

## 2020-08-08 RX ADMIN — ACETAMINOPHEN 975 MG: 325 TABLET, FILM COATED ORAL at 00:02

## 2020-08-08 ASSESSMENT — MIFFLIN-ST. JEOR: SCORE: 1615.82

## 2020-08-08 NOTE — PROGRESS NOTES
Post-partum Note      S: Patient is doing well today.  Pain is controlled with PO medications.  Tolerating regular diet without nausea or vomiting. Voiding. Baby in NICU. Lochia normal.  Pumping for baby Jesus Alberto (STEFANY). Doing well, getting between 2-6oz per pump.No headache/chest pain/shortness of breath.       O:   Patient Vitals for the past 24 hrs:   BP Temp Temp src Pulse Resp SpO2 Weight   08/08/20 0500 (!) 138/95 97.8  F (36.6  C) -- 69 16 -- 81.6 kg (179 lb 12.8 oz)   08/08/20 0010 (!) 135/90 97.8  F (36.6  C) Oral 70 16 -- --   08/07/20 2000 (!) 148/95 97.9  F (36.6  C) Oral 80 16 -- --   08/07/20 1632 -- -- -- -- 16 98 % --   08/07/20 1530 (!) 139/90 97.7  F (36.5  C) Axillary 82 16 99 % --   08/07/20 1430 -- -- -- -- 16 99 % --   08/07/20 1330 -- -- -- -- 16 99 % --   08/07/20 1205 -- -- -- -- 16 98 % --   08/07/20 1130 (!) 136/93 97.4  F (36.3  C) Oral 91 16 99 % --   08/07/20 1026 -- -- -- -- 16 98 % --   08/07/20 0930 -- -- -- -- 16 99 % --   08/07/20 0830 134/85 97.4  F (36.3  C) Axillary 91 16 98 % --   08/07/20 0730 -- -- -- -- 16 99 % --     Gen:  Resting comfortably, NAD  Pulm:  Breathing comfortably on room air, CTAB  CV: RRR  Abd:  Soft, appropriately ttp, non-distended.Fundus at umbilicus, firm and non-tender.  Incision: intact with aquacel dressing with old serosanguinous drainage, will remove on day of discharge   Ext:  non-tender, trace bilateral LE edema, 2 beats clonus bilaterally, +3 reflexes.     I/O last 3 completed shifts:  In: 6195 [P.O.:4950; I.V.:1245]  Out: 3950 [Urine:3950]    Hgb:     Hemoglobin   Date Value Ref Range Status   08/07/2020 12.6 11.7 - 15.7 g/dL Final   08/06/2020 12.8 11.7 - 15.7 g/dL Final       Assessment/Plan:  29 year old G1 now  who is POD2 after pLTCS at 31w5d for gestational HTN with severe features (on labetalol TID for blood pressures) and non reassuring fetal status (BPP 6/8 with REDF on doppler) and spontaneous decelerations on tracing.     1.  Continue with routine postpartum management  2. Incision is c/d/I with suture  3. EBL: 600ml ; pre hemoglobin 12.8, post hemogobin 12.6 no symptoms of anemia. RPT hgb POD#3  4.   Lab Results   Component Value Date    ABO AB 08/06/2020    RH Pos 08/06/2020    , Rubella immune  5. Feed: pumping for baby in NICU  6. CV/RESP:   -severe GHTN: labs normal except for elevated Cr (rpt BMP on POD#3). S/p mag for sz ppx. BP stable. No acute treatment. On labetalol 400 mg in am, 300/300 in afternoon/evening. Bp controlled well. No sx of preeclampsia  -IS Q 2 hours   7. DVT PPX: Scd  8. DISPO: anticipate discharge home on POD#3    Michele Tejada DO  August 8, 2020  [phone]

## 2020-08-08 NOTE — PLAN OF CARE
Patient taking Tylenol and Ibuprofen for pain with adequate pain relief. Patient ambulating independently, voiding without difficulty. Patient's BP elevated this AM, Dr. Tejada notified, order received to start Labetalol TID. Before afternoon dose, BP-153/103, 2 hours after dose BP-145/97. Patient pumping q3hrs and visited infant in NICU. Encouraged patient to call with needs/questions. Call light within reach, will continue to monitor.

## 2020-08-08 NOTE — LACTATION NOTE
Routine visit with STAR Arango.  Baby in NICU.  General breastfeeding and pumping information reviewed.  Dietary and supplements discussed.  Continuing to pump/hand express every 3 hours.   No further questions at this time.   Will follow as needed.   Fely Ledezma BSN, RN, PHN, RNC-MNN, IBCLC

## 2020-08-08 NOTE — PLAN OF CARE
Fundus firm and bleeding wnl.  VSS.  Incision clean, dry and intact and covered with an aquacel dressing.  Rates pain 3/10 and taking scheduled tylenol and ibuprofen with good relief.  Up independently.  Using abdominal binder.  Passing flatus and tolerating regular diet.  Pumping every 3 hours. Encouraged to call with questions or concerns.  Will continue to monitor.

## 2020-08-08 NOTE — PROVIDER NOTIFICATION
08/08/20 0830   Provider Notification   Provider Name/Title Dr. Tejada    Method of Notification Phone   Request Evaluate-Remote   Notification Reason Vital Signs Change   Dr. Tejada notified of BP trending up, order received for Labetalol 300mg TID.

## 2020-08-09 LAB
ALBUMIN SERPL-MCNC: 2.6 G/DL (ref 3.4–5)
ALP SERPL-CCNC: 105 U/L (ref 40–150)
ALT SERPL W P-5'-P-CCNC: 23 U/L (ref 0–50)
ANION GAP SERPL CALCULATED.3IONS-SCNC: 6 MMOL/L (ref 3–14)
AST SERPL W P-5'-P-CCNC: 23 U/L (ref 0–45)
BILIRUB SERPL-MCNC: 0.3 MG/DL (ref 0.2–1.3)
BUN SERPL-MCNC: 13 MG/DL (ref 7–30)
CALCIUM SERPL-MCNC: 8.4 MG/DL (ref 8.5–10.1)
CHLORIDE SERPL-SCNC: 110 MMOL/L (ref 94–109)
CO2 SERPL-SCNC: 22 MMOL/L (ref 20–32)
CREAT SERPL-MCNC: 0.88 MG/DL (ref 0.52–1.04)
ERYTHROCYTE [DISTWIDTH] IN BLOOD BY AUTOMATED COUNT: 13.8 % (ref 10–15)
GFR SERPL CREATININE-BSD FRML MDRD: 87 ML/MIN/{1.73_M2}
GLUCOSE SERPL-MCNC: 120 MG/DL (ref 70–99)
HCT VFR BLD AUTO: 34.8 % (ref 35–47)
HGB BLD-MCNC: 11 G/DL (ref 11.7–15.7)
MCH RBC QN AUTO: 29.6 PG (ref 26.5–33)
MCHC RBC AUTO-ENTMCNC: 31.6 G/DL (ref 31.5–36.5)
MCV RBC AUTO: 94 FL (ref 78–100)
PLATELET # BLD AUTO: 182 10E9/L (ref 150–450)
POTASSIUM SERPL-SCNC: 3.8 MMOL/L (ref 3.4–5.3)
PROT SERPL-MCNC: 5.8 G/DL (ref 6.8–8.8)
RBC # BLD AUTO: 3.71 10E12/L (ref 3.8–5.2)
SODIUM SERPL-SCNC: 138 MMOL/L (ref 133–144)
WBC # BLD AUTO: 12 10E9/L (ref 4–11)

## 2020-08-09 PROCEDURE — 25000132 ZZH RX MED GY IP 250 OP 250 PS 637: Performed by: OBSTETRICS & GYNECOLOGY

## 2020-08-09 PROCEDURE — 85027 COMPLETE CBC AUTOMATED: CPT | Performed by: OBSTETRICS & GYNECOLOGY

## 2020-08-09 PROCEDURE — 36415 COLL VENOUS BLD VENIPUNCTURE: CPT | Performed by: OBSTETRICS & GYNECOLOGY

## 2020-08-09 PROCEDURE — 80053 COMPREHEN METABOLIC PANEL: CPT | Performed by: OBSTETRICS & GYNECOLOGY

## 2020-08-09 PROCEDURE — 12000035 ZZH R&B POSTPARTUM

## 2020-08-09 RX ADMIN — ACETAMINOPHEN 975 MG: 325 TABLET, FILM COATED ORAL at 12:18

## 2020-08-09 RX ADMIN — LABETALOL HYDROCHLORIDE 300 MG: 100 TABLET, FILM COATED ORAL at 14:05

## 2020-08-09 RX ADMIN — ACETAMINOPHEN 975 MG: 325 TABLET, FILM COATED ORAL at 00:09

## 2020-08-09 RX ADMIN — IBUPROFEN 800 MG: 400 TABLET ORAL at 00:09

## 2020-08-09 RX ADMIN — ACETAMINOPHEN 975 MG: 325 TABLET, FILM COATED ORAL at 06:20

## 2020-08-09 RX ADMIN — LABETALOL HYDROCHLORIDE 300 MG: 100 TABLET, FILM COATED ORAL at 00:09

## 2020-08-09 RX ADMIN — LABETALOL HYDROCHLORIDE 300 MG: 100 TABLET, FILM COATED ORAL at 07:30

## 2020-08-09 RX ADMIN — OXYCODONE HYDROCHLORIDE 5 MG: 5 TABLET ORAL at 20:18

## 2020-08-09 RX ADMIN — ACETAMINOPHEN 975 MG: 325 TABLET, FILM COATED ORAL at 17:57

## 2020-08-09 RX ADMIN — PRENATAL VITAMINS-IRON FUMARATE 27 MG IRON-FOLIC ACID 0.8 MG TABLET 1 TABLET: at 08:16

## 2020-08-09 RX ADMIN — DOCUSATE SODIUM 50 MG AND SENNOSIDES 8.6 MG 1 TABLET: 8.6; 5 TABLET, FILM COATED ORAL at 08:16

## 2020-08-09 RX ADMIN — IBUPROFEN 800 MG: 400 TABLET ORAL at 06:20

## 2020-08-09 RX ADMIN — DOCUSATE SODIUM 50 MG AND SENNOSIDES 8.6 MG 1 TABLET: 8.6; 5 TABLET, FILM COATED ORAL at 00:09

## 2020-08-09 ASSESSMENT — MIFFLIN-ST. JEOR: SCORE: 1609.92

## 2020-08-09 NOTE — PROGRESS NOTES
Post-partum Note      S: Patient is doing well today.  Pain is controlled with PO medications.  Tolerating regular diet without nausea or vomiting. Voiding. Baby in NICU. Lochia normal.  Pumping for baby Jesus Alberto TRIVEDI). Doing well, getting between 2-6oz per pump.No headache/chest pain/shortness of breath.       O:   Patient Vitals for the past 24 hrs:   BP Temp Temp src Pulse Resp Weight   08/09/20 0900 (!) 148/101 97.7  F (36.5  C) Axillary 79 16 --   08/09/20 0730 (!) 147/91 -- -- 81 -- --   08/09/20 0430 (!) 141/95 -- -- 79 16 --   08/09/20 0000 (!) 142/89 -- -- 66 16 81 kg (178 lb 8 oz)   08/08/20 2000 (!) 145/92 98.3  F (36.8  C) -- 74 16 --   08/08/20 1700 (!) 145/97 -- -- 78 16 --   08/08/20 1517 (!) 153/103 98.7  F (37.1  C) Oral 63 16 --   08/08/20 1209 (!) 146/93 97.9  F (36.6  C) Oral 74 16 --     Gen:  Resting comfortably, NAD  Pulm:  Breathing comfortably on room air, CTAB  CV: RRR  Abd:  Soft, appropriately ttp, non-distended.Fundus at umbilicus, firm and non-tender.  Incision: intact with aquacel dressing with old serosanguinous drainage, will remove on day of discharge   Ext:  non-tender, trace bilateral LE edema, 2 beats clonus bilaterally, +3 reflexes.     I/O last 3 completed shifts:  In: 3550 [P.O.:3550]  Out: 3950 [Urine:3950]    Hgb:     Hemoglobin   Date Value Ref Range Status   08/09/2020 11.0 (L) 11.7 - 15.7 g/dL Final   08/07/2020 12.6 11.7 - 15.7 g/dL Final       Assessment/Plan:  29 year old G1 now  who is POD3 after pLTCS at 31w5d for gestational HTN with severe features (on labetalol TID for blood pressures) and non reassuring fetal status (BPP 6/8 with REDF on doppler) and spontaneous decelerations on tracing.     1. Continue with routine postpartum management  2. Incision is c/d/I with suture  3. EBL: 600ml ; pre hemoglobin 12.8, post hemogobin 12.6 no symptoms of anemia.   4.   Lab Results   Component Value Date    ABO AB 08/06/2020    RH Pos 08/06/2020    , Rubella immune  5.  Feed: pumping for baby in NICU  6. CV/RESP:   -severe GHTN: labs normal except for elevated Cr (rpt BMP on POD#3). S/p mag for sz ppx. BP elevated. No acute treatment. On labetalol 300 mg in am, 300/300 in afternoon/evening. Bp elevated.  will discharge motrin. No sx of preeclampsia  -IS Q 2 hours   7. DVT PPX: Scd  8. DISPO: anticipate discharge home on POD#4    Michele Tejada,   August 9, 2020  [phone]

## 2020-08-09 NOTE — PLAN OF CARE
Fundus firm and bleeding wnl.  VSS.  Incision covered with aquacel dressing.  Rates pain 3/10 and taking scheduled tylenol and ibuprofen  with good relief.  Up independently.  Passing flatus and tolerating regular diet.  Reflexes WNL,  no clonus. Encouraged to call with questions or concerns.

## 2020-08-09 NOTE — LACTATION NOTE
Routine visit with Conchita and Sukhwinder. Conchita pumping and yielding 70ml.  Pumping for 20 minutes then pushing gtts button and pumping for additional 5  Minutes.  Hot packs and bags for cool water to place after pumping to help with engorgement.   No further questions at this time.   Will follow as needed.   Fely Ledezma BSN, RN, PHN, RNC-MNN, IBCLC

## 2020-08-09 NOTE — PLAN OF CARE
Patient taking Tylenol for pain with adequate pain relief. Patient pumping q3hrs and visiting infant in NICU. Patient ambulating independently, voiding without difficulty. Encouraged patient to call with needs/questions. Call light within reach, will continue to monitor.

## 2020-08-10 LAB — COPATH REPORT: NORMAL

## 2020-08-10 PROCEDURE — 25000132 ZZH RX MED GY IP 250 OP 250 PS 637: Performed by: OBSTETRICS & GYNECOLOGY

## 2020-08-10 PROCEDURE — 12000035 ZZH R&B POSTPARTUM

## 2020-08-10 RX ORDER — NIFEDIPINE 30 MG/1
30 TABLET, EXTENDED RELEASE ORAL EVERY 12 HOURS
Status: DISCONTINUED | OUTPATIENT
Start: 2020-08-10 | End: 2020-08-11 | Stop reason: HOSPADM

## 2020-08-10 RX ADMIN — DOCUSATE SODIUM 50 MG AND SENNOSIDES 8.6 MG 1 TABLET: 8.6; 5 TABLET, FILM COATED ORAL at 00:02

## 2020-08-10 RX ADMIN — OXYCODONE HYDROCHLORIDE 5 MG: 5 TABLET ORAL at 00:02

## 2020-08-10 RX ADMIN — ACETAMINOPHEN 975 MG: 325 TABLET, FILM COATED ORAL at 12:07

## 2020-08-10 RX ADMIN — PRENATAL VITAMINS-IRON FUMARATE 27 MG IRON-FOLIC ACID 0.8 MG TABLET 1 TABLET: at 08:10

## 2020-08-10 RX ADMIN — ACETAMINOPHEN 975 MG: 325 TABLET, FILM COATED ORAL at 18:29

## 2020-08-10 RX ADMIN — DOCUSATE SODIUM 50 MG AND SENNOSIDES 8.6 MG 1 TABLET: 8.6; 5 TABLET, FILM COATED ORAL at 21:18

## 2020-08-10 RX ADMIN — OXYCODONE HYDROCHLORIDE 5 MG: 5 TABLET ORAL at 06:16

## 2020-08-10 RX ADMIN — OXYCODONE HYDROCHLORIDE 5 MG: 5 TABLET ORAL at 10:09

## 2020-08-10 RX ADMIN — LABETALOL HYDROCHLORIDE 300 MG: 100 TABLET, FILM COATED ORAL at 00:02

## 2020-08-10 RX ADMIN — LABETALOL HYDROCHLORIDE 300 MG: 100 TABLET, FILM COATED ORAL at 06:16

## 2020-08-10 RX ADMIN — ACETAMINOPHEN 975 MG: 325 TABLET, FILM COATED ORAL at 06:16

## 2020-08-10 RX ADMIN — ACETAMINOPHEN 975 MG: 325 TABLET, FILM COATED ORAL at 00:02

## 2020-08-10 RX ADMIN — OXYCODONE HYDROCHLORIDE 5 MG: 5 TABLET ORAL at 14:35

## 2020-08-10 RX ADMIN — NIFEDIPINE 30 MG: 30 TABLET, FILM COATED, EXTENDED RELEASE ORAL at 12:58

## 2020-08-10 RX ADMIN — OXYCODONE HYDROCHLORIDE 5 MG: 5 TABLET ORAL at 18:29

## 2020-08-10 RX ADMIN — LABETALOL HYDROCHLORIDE 300 MG: 100 TABLET, FILM COATED ORAL at 14:35

## 2020-08-10 ASSESSMENT — MIFFLIN-ST. JEOR: SCORE: 1602.66

## 2020-08-10 NOTE — PROGRESS NOTES
[facility]   Obstetrics Post-Op / Progress Note         Assessment and Plan:    Assessment:   Post-operative day #4  Low transverse primary  section  L&D complications: Pre-eclampsia with severe features  Non reassuring fetal status      Post partum HTN      Plan:   Blood pressures remain elevated (150/100's) on labetalol 300 mg TID; recommend addition of procardia 30 XL BID. Discussed with pt and  that bp should be in 140/80 or less range prior to discharge. Pt denies HA all lab parameters remain in normal range.              Interval History:   Doing well.  Pain is controlled.  No fevers.  No history of wound drainage, warmth or significant erythema.  Good appetite.  Denies chest pain, shortness of breath, nausea or vomiting.  Breastfeeding well.   Denies HA, SOB, visual changes or abdominal pain          Significant Problems:    post partum HTN          Review of Systems:   Voiding without difficulty. Lochia normal. Ambulating. Tolerating PO. Pain well controlled. BreastfedThe patient denies any chest pain, shortness of breath, excessive pain, fever, chills, purulent drainage from the wound, nausea or vomiting.          Physical Exam:   bp 140-150/100's    Wound clean and dry with minimal or no drainage.  Surrounding skin with minimal erythema.          Data:   All laboratory data related to this surgery reviewed  Lab Results   Component Value Date    WBC 12.0 (H) 2020    HGB 11.0 (L) 2020    HCT 34.8 (L) 2020     2020     2020    POTASSIUM 3.8 2020    CHLORIDE 110 (H) 2020    CO2 22 2020    BUN 13 2020    CR 0.88 2020     (H) 2020    AST 23 2020    ALT 23 2020    ALKPHOS 105 2020    BILITOTAL 0.3 2020    INR 0.84 (L) 2020       Valeria Greenwood MD

## 2020-08-10 NOTE — PLAN OF CARE
Fundus firm and bleeding wnl.  BP remains in the 140's/90's.  Incision covered with an aquacel dressing.  Rates pain 3/10, taking tylenol and oxycodone as needed with good relief.  Up independently.  Using abdominal binder.  Passing flatus and tolerating regular diet. Reflexes WNL, Negative for clonus. Encouraged to call with questions or concerns.

## 2020-08-10 NOTE — PROGRESS NOTES
BRIEF NUTRITION REASSESSMENT      REASON FOR ASSESSMENT:  Conchita Boss is a 29 year old female seen by Registered Dietitian for Follow up    CURRENT DIET AND INTAKE:  Diet: Regular              Per RN notes, pt tolerating regular diet well.  Per flowsheet, pt taking 100% of meals.    UPDATES:  8/10: Wt - 80.2 kg (176 lb 14.4 oz) - trending down after delivery  : Delivery via     LABS:  Labs noted    MALNUTRITION:  Visual Nutrition Focused Physical Assessment (NFPA) not completed due to restrictions on face-to-face patient care during COVID-19 Pandemic. Do not suspect muscle/fat losses.  Patient does not meet two of the criteria necessary for diagnosing malnutrition.     NUTRITION INTERVENTION:  Nutrition Diagnosis:  No nutrition diagnosis at this time.    Implementation:  Nutrition Education:  Per Provider order if indicated    FOLLOW UP/MONITORING:   Will re-evaluate in 7 - 10 days, or sooner, if re-consulted.    Sameera Rowley RDN, LD

## 2020-08-10 NOTE — LACTATION NOTE
Routine visit with Conchita and Sukhwinder. Conchita is pumping every three hours with the exception of a trying to get a four hour stint of sleep once overnight. Conchita is yielding about 80ml with each pumping session. Discussed watching for milk flow, when milk droplets slow, pump additional couple of minutes and then manually massage breasts for maximum emptying. Conchita feeling engorged, shared her pumping bra was pretty tight. LC discouraged against using a tight fitting bra. LC made Conchita a hands free pumping bra from abdominal binder, which Conchita states is more comfortable.    Will follow as requested.

## 2020-08-11 VITALS
OXYGEN SATURATION: 98 % | WEIGHT: 172.1 LBS | SYSTOLIC BLOOD PRESSURE: 124 MMHG | BODY MASS INDEX: 24.64 KG/M2 | HEART RATE: 79 BPM | RESPIRATION RATE: 16 BRPM | DIASTOLIC BLOOD PRESSURE: 83 MMHG | HEIGHT: 70 IN | TEMPERATURE: 98.1 F

## 2020-08-11 LAB
ALBUMIN SERPL-MCNC: 2.9 G/DL (ref 3.4–5)
ALP SERPL-CCNC: 112 U/L (ref 40–150)
ALT SERPL W P-5'-P-CCNC: 59 U/L (ref 0–50)
ANION GAP SERPL CALCULATED.3IONS-SCNC: 4 MMOL/L (ref 3–14)
AST SERPL W P-5'-P-CCNC: 35 U/L (ref 0–45)
BILIRUB SERPL-MCNC: 0.2 MG/DL (ref 0.2–1.3)
BUN SERPL-MCNC: 11 MG/DL (ref 7–30)
CALCIUM SERPL-MCNC: 8.8 MG/DL (ref 8.5–10.1)
CHLORIDE SERPL-SCNC: 108 MMOL/L (ref 94–109)
CO2 SERPL-SCNC: 27 MMOL/L (ref 20–32)
CREAT SERPL-MCNC: 0.88 MG/DL (ref 0.52–1.04)
ERYTHROCYTE [DISTWIDTH] IN BLOOD BY AUTOMATED COUNT: 13.5 % (ref 10–15)
GFR SERPL CREATININE-BSD FRML MDRD: 88 ML/MIN/{1.73_M2}
GLUCOSE SERPL-MCNC: 76 MG/DL (ref 70–99)
HCT VFR BLD AUTO: 38 % (ref 35–47)
HGB BLD-MCNC: 12.5 G/DL (ref 11.7–15.7)
MCH RBC QN AUTO: 30.8 PG (ref 26.5–33)
MCHC RBC AUTO-ENTMCNC: 32.9 G/DL (ref 31.5–36.5)
MCV RBC AUTO: 94 FL (ref 78–100)
PLATELET # BLD AUTO: 235 10E9/L (ref 150–450)
POTASSIUM SERPL-SCNC: 4.1 MMOL/L (ref 3.4–5.3)
PROT SERPL-MCNC: 6.8 G/DL (ref 6.8–8.8)
RBC # BLD AUTO: 4.06 10E12/L (ref 3.8–5.2)
SODIUM SERPL-SCNC: 139 MMOL/L (ref 133–144)
WBC # BLD AUTO: 8.8 10E9/L (ref 4–11)

## 2020-08-11 PROCEDURE — 25000132 ZZH RX MED GY IP 250 OP 250 PS 637: Performed by: OBSTETRICS & GYNECOLOGY

## 2020-08-11 PROCEDURE — 85027 COMPLETE CBC AUTOMATED: CPT | Performed by: OBSTETRICS & GYNECOLOGY

## 2020-08-11 PROCEDURE — 36415 COLL VENOUS BLD VENIPUNCTURE: CPT | Performed by: OBSTETRICS & GYNECOLOGY

## 2020-08-11 PROCEDURE — 80053 COMPREHEN METABOLIC PANEL: CPT | Performed by: OBSTETRICS & GYNECOLOGY

## 2020-08-11 RX ORDER — LABETALOL 300 MG/1
300 TABLET, FILM COATED ORAL EVERY 8 HOURS
Qty: 60 TABLET | Refills: 1 | Status: SHIPPED | OUTPATIENT
Start: 2020-08-11 | End: 2021-12-27

## 2020-08-11 RX ORDER — ACETAMINOPHEN 325 MG/1
975 TABLET ORAL EVERY 6 HOURS
Qty: 90 TABLET | Refills: 0 | Status: SHIPPED | OUTPATIENT
Start: 2020-08-11 | End: 2021-12-27

## 2020-08-11 RX ORDER — NIFEDIPINE 30 MG
30 TABLET, EXTENDED RELEASE ORAL EVERY 12 HOURS
Qty: 40 TABLET | Refills: 1 | Status: SHIPPED | OUTPATIENT
Start: 2020-08-11 | End: 2021-12-27

## 2020-08-11 RX ORDER — OXYCODONE HYDROCHLORIDE 5 MG/1
5 TABLET ORAL EVERY 4 HOURS PRN
Qty: 20 TABLET | Refills: 0 | Status: SHIPPED | OUTPATIENT
Start: 2020-08-11 | End: 2021-12-27

## 2020-08-11 RX ADMIN — ACETAMINOPHEN 975 MG: 325 TABLET, FILM COATED ORAL at 00:02

## 2020-08-11 RX ADMIN — ACETAMINOPHEN 975 MG: 325 TABLET, FILM COATED ORAL at 06:02

## 2020-08-11 RX ADMIN — NIFEDIPINE 30 MG: 30 TABLET, FILM COATED, EXTENDED RELEASE ORAL at 00:02

## 2020-08-11 RX ADMIN — LABETALOL HYDROCHLORIDE 300 MG: 100 TABLET, FILM COATED ORAL at 06:01

## 2020-08-11 RX ADMIN — PRENATAL VITAMINS-IRON FUMARATE 27 MG IRON-FOLIC ACID 0.8 MG TABLET 1 TABLET: at 08:41

## 2020-08-11 RX ADMIN — OXYCODONE HYDROCHLORIDE 5 MG: 5 TABLET ORAL at 04:06

## 2020-08-11 RX ADMIN — DOCUSATE SODIUM 50 MG AND SENNOSIDES 8.6 MG 1 TABLET: 8.6; 5 TABLET, FILM COATED ORAL at 08:41

## 2020-08-11 ASSESSMENT — MIFFLIN-ST. JEOR: SCORE: 1580.89

## 2020-08-11 NOTE — PROGRESS NOTES
[facility]   Obstetrics Post-Op / Progress Note             Interval History:   Doing well.  Pain is well-controlled.  No fevers.  No history of wound drainage, warmth or significant erythema.  Good appetite.  Denies chest pain, shortness of breath, nausea or vomiting.  Ambulatory.  Baby doing well in NICU          Significant Problems:    None          Review of Systems:    The patient denies any chest pain, shortness of breath, excessive pain, fever, chills, purulent drainage from the wound, nausea or vomiting.          Medications:   All medications related to the patient's surgery have been reviewed          Physical Exam:     All vitals stable  Patient Vitals for the past 12 hrs:   BP Temp Temp src Heart Rate Resp Weight   08/11/20 0840 124/83 98.1  F (36.7  C) Oral 79 16 --   08/11/20 0602 -- -- -- -- 16 --   08/11/20 0600 (!) 139/97 -- -- -- -- --   08/11/20 0406 -- -- -- -- 16 --   08/11/20 0400 (!) 140/92 97.7  F (36.5  C) Oral 65 16 78.1 kg (172 lb 1.6 oz)   08/11/20 0000 126/83 97.6  F (36.4  C) Oral 67 16 --     Wound clean and dry with minimal or no drainage.  Surrounding skin with minimal erythema. Steri strips in place  LE non tender          Data:   All laboratory data related to this surgery reviewed  Lab Results   Component Value Date    WBC 8.8 08/11/2020    WBC 12.0 (H) 08/09/2020    WBC 18.6 (H) 08/07/2020    HGB 12.5 08/11/2020    HGB 11.0 (L) 08/09/2020    HGB 12.6 08/07/2020    HCT 38.0 08/11/2020    HCT 34.8 (L) 08/09/2020    HCT 37.7 08/07/2020     08/11/2020     08/09/2020     08/07/2020     08/11/2020     08/09/2020     08/07/2020    POTASSIUM 4.1 08/11/2020    POTASSIUM 3.8 08/09/2020    POTASSIUM 4.3 08/07/2020    CHLORIDE 108 08/11/2020    CHLORIDE 110 (H) 08/09/2020    CHLORIDE 106 08/07/2020    CO2 27 08/11/2020    CO2 22 08/09/2020    CO2 23 08/07/2020    BUN 11 08/11/2020    BUN 13 08/09/2020    BUN 16 08/07/2020    CR 0.88 08/11/2020     CR 0.88 2020    CR 1.09 (H) 2020    GLC 76 2020     (H) 2020     (H) 2020    AST 35 2020    AST 23 2020    AST 30 2020    ALT 59 (H) 2020    ALT 23 2020    ALT 30 2020    ALKPHOS 112 2020    ALKPHOS 105 2020    ALKPHOS 135 2020    BILITOTAL 0.2 2020    BILITOTAL 0.3 2020    BILITOTAL 0.2 2020    INR 0.84 (L) 2020     No imaging studies have been ordered         Assessment and Plan:    Assessment:   Post-operative day #5  Low transverse primary  section  Pre eclampsia with severe features  L&D complications: Indication for care in labor or delivery [O75.9]      Doing well.  Pain well-controlled.  BPs improved after addition of Procardia XL 30 mg po bid x past 24 hours. Now on labetalol 300 mg po tid plus procardia XL 30 mg po bid      Plan:   Discharge later today  rx for procardia 30 mg XL, labetalol 300 mg po tid, oxycodone 5 mg #20, tylenol  Pt has home BP cuff. To check bid and record.   Pt to call or make office visit (will be near by in NICU daily) Friday for BP follow up. Anticipate BPs decreasing and meds being lowered over the next few weeks  Parameters to call for reviewed.   Restrictions reviewed       Lindsay Rajan MD

## 2020-08-11 NOTE — PLAN OF CARE
Discharge instructions discussed with pt who verbalizes understanding. Awaiting prescriptions from discharge pharmacy. Ready to discharge home infant to remain in NICU.

## 2020-08-11 NOTE — PLAN OF CARE
Vital signs stable and postpartum assessment WDL. Blood pressures 126/83, 140/92, and 139/97 this shift. Nifedipine and labetalol given. Patient has mild headache but denies all other s/s of preeclampsia. Reflexes +2 bilaterally, no clonus. Incision open to air and secured with steri strips. Pain controlled with tylenol an 5 mg of oxycodone. Patient up ad nydia and voiding adequately. Pumping every 3 hours. Labs this AM. Will continue with current plan of care.

## 2020-08-11 NOTE — PLAN OF CARE
Vitals stable. Up ad nydia well. Voiding without difficulty. Pain controlled with PO meds. Discharging home today. Discharge prescriptions sent to discharge pharmacy to be filled.

## 2020-08-11 NOTE — PROVIDER NOTIFICATION
08/10/20 2157   Provider Notification   Provider Name/Title Dr. Jael Vieyra   Method of Notification Phone   Notification Reason Status Update     Dr. Jael Vieyra was notified with patient's current BP of 127/82.  She ordered to hold the 2200 order of labetalol.  Hold labetalol if BP <130/80.  Hold procardia if BP < 110/60.  Will continue to monitor.

## 2020-08-12 ENCOUNTER — LACTATION ENCOUNTER (OUTPATIENT)
Age: 30
End: 2020-08-12

## 2020-08-12 NOTE — LACTATION NOTE
This note was copied from a baby's chart.  Routine visit with STAR Arango and baby katie Stevens.  Conchita pumping and feeling engorgement, using hot packs and cool gel pack as needed. Reviewed comfort measures and to pump for a few minutes less at every other or every third pumping session in order to leave some milk in the breast.   No further questions at this time. Will follow as needed. Fely FRIEDN, RN, PHN, RNC-MNN, IBCLC

## 2020-08-20 NOTE — DISCHARGE SUMMARY
OB  Discharge Summary    DOA: 2020  DOD:  2020    Admission Diagnosis  1.  IUP @ 31w4d   2.  Preeclampsia with severe features  3.  Non-reassuring fetal status (abnoral Doppler)    Discharge Diagnosis  1.  IUP @ 31w4d   2.  Preeclampsia with severe features  3.  POD# 5 s/p primary LTCS       HPI / Hospital Course:     Patient is a 29 year old [id number] presented to L&D at 29+5 for evaluation of elevated blood pressures.  She was diagnosed with severe gestational hypertension, and received magnesium sulfate and betamethasone. Labs showed normal LFTs and platelet count; creatinine baseline was 0.90 and increased to above 1.00 but never twice normal; urine P/C ratio was 0.014 g/g; 24 hour urine was 240 mg. MFM consult was placed and ultrasound evaluation showed no evidence of growth restriction.    Due to persistently elevated blood pressures, she was started on labetalol TID which kept her blood pressures mostly in the mild range. She remained pregnant until 31+2, at which time US findings included reverse end diastolic flow with 6/8 BPP. NST showed occasional late decelerations and two prolonged decelerations. Delivery was recommended by Dr. Manzano. I recommended  for delivery as fetal tracing showed increased frequency of decelerations without any labor induction agents. Patient agreed, consents signed.      Intra-operative course:  Patient was taken to the operating room for the above noted procedure. No complications.  For full details of intraoperative course, please see dictated operative note.  Delivery of viable male infant, weight 1531g, Apgars 8 & 8. QBL 180mL    Post-operative course:   Patient's post-operative course was complicated by elevated blood pressures.  By post-operative day #5 her blood pressures were controlled with rocardia XL 30 mg BID and labetalol 300 mg TID.  She was ambulating, her pain was well-controlled on oral pain medications, her bleeding was minimal, she  was tolerating PO, she was voiding and passing flatus.  Patient was deemed stable for discharge.  Her post-op Hgb was   Lab Results   Component Value Date    HGB 12.5 08/11/2020    HGB 11.0 08/09/2020   On the day of discharge, her wound was without evidence of infection.  Her staples were removed and steri-strips were applied.      Post-op instructions:  1.  Patient was instructed to RTC in 1 week for bp check.  2.  RTC 6 weeks for post-partum visit.  3.  Patient was instructed to call MD for temperature greater than 100.4, foul smelling vaginal discharge, bleeding >1pad per hour, severe pain not controlled by pain medications, severe HA, redness/drainage from incision, asymmetric LE edema or with other concerns.  4.  Post-partum mood symptoms discussed.  Pt will call with si/sx of depression.  5.  Patient instructed to avoid lifting >20# x 6 weeks, to avoid vigorous exercise x 6 weeks, to observe pelvic rest x 6 weeks (no tampons or IC) and to avoid driving while taking narcotics.  6.  Patient instructed to remove steri-strips after 10 days.       Post-op medications:  1.  Procardia XL 30 mg p.o. BID  2.  Labetalol 300 mg p.o. TID  3.  Ibuprofen 600mg PO Q6hrs PRN pain  4.  Oxycodone 5mg PO Q4hrs PRN severe pain  5.  Colace or other stool softener as needed  6.  PNV  7.  Resume home medications    Mary Pettit MD  8/20/2020  12:25 PM

## 2020-12-13 ENCOUNTER — HEALTH MAINTENANCE LETTER (OUTPATIENT)
Age: 30
End: 2020-12-13

## 2021-04-17 ENCOUNTER — HEALTH MAINTENANCE LETTER (OUTPATIENT)
Age: 31
End: 2021-04-17

## 2021-05-07 DIAGNOSIS — R03.0 ELEVATED BLOOD PRESSURE READING WITHOUT DIAGNOSIS OF HYPERTENSION: Primary | ICD-10-CM

## 2021-05-25 ENCOUNTER — HOSPITAL ENCOUNTER (OUTPATIENT)
Dept: CARDIOLOGY | Facility: CLINIC | Age: 31
Discharge: HOME | End: 2021-05-25
Attending: NURSE PRACTITIONER | Admitting: NURSE PRACTITIONER
Payer: OTHER GOVERNMENT

## 2021-05-25 DIAGNOSIS — R03.0 ELEVATED BLOOD PRESSURE READING WITHOUT DIAGNOSIS OF HYPERTENSION: ICD-10-CM

## 2021-05-25 PROCEDURE — 93790 AMBL BP MNTR W/SW I&R: CPT | Performed by: INTERNAL MEDICINE

## 2021-05-25 PROCEDURE — 93788 AMBL BP MNTR W/SW A/R: CPT

## 2021-08-30 ENCOUNTER — TRANSFERRED RECORDS (OUTPATIENT)
Dept: HEALTH INFORMATION MANAGEMENT | Facility: CLINIC | Age: 31
End: 2021-08-30

## 2021-09-07 ENCOUNTER — TRANSCRIBE ORDERS (OUTPATIENT)
Dept: MATERNAL FETAL MEDICINE | Facility: CLINIC | Age: 31
End: 2021-09-07

## 2021-09-07 ENCOUNTER — MEDICAL CORRESPONDENCE (OUTPATIENT)
Dept: HEALTH INFORMATION MANAGEMENT | Facility: CLINIC | Age: 31
End: 2021-09-07

## 2021-09-07 DIAGNOSIS — Z31.69 ENCOUNTER FOR PRECONCEPTION CONSULTATION: Primary | ICD-10-CM

## 2021-09-08 ENCOUNTER — TRANSCRIBE ORDERS (OUTPATIENT)
Dept: MATERNAL FETAL MEDICINE | Facility: CLINIC | Age: 31
End: 2021-09-08

## 2021-09-08 DIAGNOSIS — Z31.69 ENCOUNTER FOR PRECONCEPTION CONSULTATION: Primary | ICD-10-CM

## 2021-10-02 ENCOUNTER — HEALTH MAINTENANCE LETTER (OUTPATIENT)
Age: 31
End: 2021-10-02

## 2021-12-20 NOTE — PROGRESS NOTES
Maternal-Fetal Medicine Consultation    Conchita Boss  : 1990  MRN: [id number]    REFERRAL:  Conchita Boss is a 31 year old sent by Dr. Pettit from [facility] for preconception consultation     HPI:  Conchita Boss is a 31 year old here for [facility] preconception consultation for concerns regarding her history of early onset severe preeclampsia.    Her first pregnancy occurred in . At 29w5d, she developed elevated blood pressures and was diagnosed with severe gestational hypertension. Her laboratory evaluation was otherwise normal (LFTs/Plts normal, Cr 0.9-1.0, and urine P/C ratio was 0.014 g/g; 24 hour urine was 240 mg). She received IV magnesium and betamethasone. She was subsequently started on Labetalol TID, which helped to control her blood pressures.  At 31w4d, the fetus was noted to have intermittent reverse end diastolic flow and a BPP 6/8. The fetus was not growth restricted (EFW 40%ile). On evaluation of the fetal heart tracing, the fetus was also noted to have occasional late decelerations with intermittent prolonged decelerations and thus was delivery was recommended. She was delivered via primary low transverse  section d/t non reassuring fetal heart tracing.  Her infant weighed 1531 grams and had Apgars of 8/8. Her son has a diagnosis of cerebral palsy. Placental pathology demonstrated: 1) Placenta disc with multiple infarcts and weight within normal limits (between tenth percentile and ninetieth percentile) for 31 weeks gestational age; 2) Membranes without diagnostic abnormality; 3) Umbilical cord without diagnostic abnormality. She was discharged home on Procardia 30 mg XL and Labetalol 300 mg TID.    Since her last pregnancy, she has been diagnosed with chronic kidney disease.  She is following regularly with Nephrology and her last visit was on 21.  Since then she has been following with her PCP.  Her current medication regimen consists of Metoprolol 25 mg BID.   Her home blood pressure ranges from 120-130/95.  Her most recent labs on 21 are as follows - Cr 0.90 (improvement from previous 1.13 on ), GFR 84, and negative UPC.    Of note, she was on an oral contraceptive pill in her early adulthood. She went on to develop hypertension which was managed with medication. When she ultimately discontinued her OCP, her hypertension improved and she did not require further treatment.    She is here today to discuss what a future pregnancy would look like.    Obstetrics History:  OB History    Para Term  AB Living   1 1 0 1 0 1   SAB IAB Ectopic Multiple Live Births   0 0 0 0 1      # Outcome Date GA Lbr Carson/2nd Weight Sex Delivery Anes PTL Lv   1  20 31w4d  1.531 kg (3 lb 6 oz) M    JIN      Name: DANIEL GROSS      Apgar1: 8  Apgar5: 8       Past Medical History:  Past Medical History:   Diagnosis Date     Anxiety      Flushing      Proteinuria 2015     Thyroid nodule 2016       Past Surgical History:  Past Surgical History:   Procedure Laterality Date      SECTION N/A 2020    Procedure:  SECTION;  Surgeon: Mary Pettit MD;  Location:  L+D     LAPAROSCOPIC LYSIS ADHESIONS N/A 2020    Procedure: LYSIS, ADHESIONS, LAPAROSCOPIC;  Surgeon: Colton Hernandes MD;  Location:  OR     OTHER SURGICAL HISTORY      partial resection of small bowel as an infant due to obstruction       Current Medications:  Current Outpatient Medications   Medication     acetaminophen (TYLENOL) 325 MG tablet     NIFEdipine ER OSMOTIC (ADALAT CC) 30 MG 24 hr tablet     Prenatal Vit-Fe Fumarate-FA (PRENATAL VITAMIN) 27-0.8 MG PO TABS     No current facility-administered medications for this visit.       Allergies:  Ceclor [cefaclor]    Social History:   Social status: .  One son.  Denies use of tobacco, alcohol, and other illicit drugs.    Family History:  Family History   Problem Relation Age of Onset     Hypertension  Father      Depression Father      Hypertension Brother      Diabetes Brother         DM1     Anxiety Disorder Mother         Also many members of family     Depression Brother      Other - See Comments Other         3 skin cancers (grandpa, dad and brother) of unknown type     Breast Cancer Paternal Aunt 52     Immunizations:  - S/p COVID vaccination series + booster  - S/p Flu vaccination    ROS:  10-point ROS negative except as in HPI     PHYSICAL EXAM:  Deferred    ASSESSMENT/PLAN:  Conchita Boss is a 31 year old here for [facility] preconception consultation for concerns regarding her history of early onset severe preeclampsia.    Chronic Hypertension  - The concern with chronic hypertension is that such patients are more likely to develop preeclampsia during the pregnancy, with a risk of 20-50%.  Women with chronic hypertension are at increased risk for early-onset preeclampsia.  Low dose aspirin has been used to lower this risk.  Chronic hypertension also increases the risk of maternal stroke, pulmonary edema, renal failure, gestational diabetes, iatrogenic  birth, fetal growth restriction, placental abruption and  mortality rate including stillbirth  - Without baseline laboratory assessment, it may be difficult to distinguish an exacerbation of hypertension from preeclampsia, especially in the third trimester. We reviewed that it is generally anticipated that blood pressure will gradually decrease during early pregnancy, reaching at annette at 28-32 weeks, and then slowly rise to pre-pregnancy levels.   - Guidelines suggest starting antihypertensive medication in women with chronic hypertension if the systolic blood pressure is persistently 160 mmHg or higher or the diastolic blood pressure is persistently 110 mmHg or higher.  If there is evidence of end organ damage (renal insufficiency, left ventricular hypertrophy or severe thrombocytopenia) a lower threshold of 150 mmHg systolic and/or 100 mm  Hg diastolic is recommended. Treatment with antihypertensives is generally used to maintain blood pressure in the general range of 120-159 mmHg systolic and  mmHg diastolic. Lower blood pressures may increase the risk of fetal growth restriction. The primary reason for antihypertensive is to reduce the risk of maternal stroke. Recommended antihypertensives with studied safety profiles in pregnancy include nifedipine and labetalol. Unfortunately, even exquisite control of blood pressure does not reduce the risk of superimposed preeclampsia.      Previous Pregnancy Complicated By Preeclampsia  - A history of preeclampsia in the past confers a recurrence risk of up to 25-30%. Subsequent pregnancies in women with a history of severe preeclampsia or eclampsia are also at increased risk of other obstetric complications compared to women with no such history. These problems include:  placental abruption,  delivery, intrauterine growth restriction, and increased  mortality.  Low dose aspirin is recommended in women with previous preeclampsia to prevent recurrence.  It should be initiated late in the first trimester. Women who have experienced preeclampsia are at a lifelong increased risk for cardiovascular disease and healthy lifestyle should be emphasized.     Preconception recommendations:  - Evaluation for possible antiphospholipid antibody syndrome with anticardiolipin antibody (IgG and IgM) titer, lupus anticoagulant, and beta-2 microglobulin (IgG and IgM), if there is a history of early onset severe preeclampsia necessitating  delivery.  If positive, would recommend initiation of prophylactic lovenox throughout pregnancy and to be continued 6 weeks postpartum. If negative, low dose aspirin therapy would be sufficient.  APS labs were ordered and drawn today.   - Initiation of prenatal vitamin three months prior to conception  - Evaluation for secondary causes of chronic hypertension such as  renal artery dopplers due to early onset preeclampsia in prior pregnancy and insignificant family history. This can be coordinated through her PCP or nephrology.  - Baseline preeclampsia labs outside prior to attempt at pregnancy (platelet count, creatinine, liver function tests and protein/creatinine ratio). Ideally at time of IUD removal to ensure no further changes and/or change in risk.  - Shortly prior to pregnancy or in pregnancy, recommend change to a more commonly used anti-hypertensive agent in pregnancy, such as Nifedipine, Labetalol, or Hydralazine.    Pregnancy recommendations:  - Continue/initiate necessary medications and titrate as needed to achieve blood pressure goal  - Early ultrasound to establish ARIANNA  - Low dose aspirin therapy (81mg) started at 12 weeks gestation and continued throughout the remainder of pregnancy  - Baseline preeclampsia labs (platelet count, creatinine, liver function tests and protein/creatinine ratio).  - Close monitoring for signs and/or symptoms of evolving preeclampsia.  - Close monitoring of blood pressure both prenatally and in the postpartum period  ? Blood pressure assessment q2 weeks after 20 weeks gestation  ? Blood pressure assessment q1 week after 30 weeks gestation  ? Reassessment of preeclampsia labs based on clinical symptoms and blood pressures  ? Home blood pressure cuff prescription for self assessment by patient. Calling parameters should be clearly laid out with regards to blood pressure and symptoms  - Comprehensive anatomy ultrasound at 18-20 weeks- scheduled with MFM.   - Serial ultrasound assessment of fetal growth every 4 weeks.  -  fetal surveillance with weekly BPP (or NST and MVP) starting at 32 weeks  - Delivery at 37-39 weeks (unless poorly controlled or otherwise indicated sooner)  - Early postpartum visit (within the first week) for blood pressure assessment  - Postpartum, medication should be adjusted to maintain the systolic blood  pressure below 150 mm Hg and the diastolic blood pressure below 100 mm Hg  - Long-term care with primary care/cardiology    Patient seen and staffed with Dr. Carson Puga MD  Maternal Fetal Medicine Fellow  12/30/2021 7:54 AM

## 2021-12-27 ENCOUNTER — PRE VISIT (OUTPATIENT)
Dept: MATERNAL FETAL MEDICINE | Facility: CLINIC | Age: 31
End: 2021-12-27
Payer: OTHER GOVERNMENT

## 2021-12-27 PROBLEM — I10 BENIGN ESSENTIAL HYPERTENSION: Status: ACTIVE | Noted: 2017-08-04

## 2021-12-27 PROBLEM — Z90.49 HISTORY OF COLON RESECTION: Status: ACTIVE | Noted: 2019-09-18

## 2021-12-27 PROBLEM — L70.9 ACNE: Status: ACTIVE | Noted: 2017-08-03

## 2021-12-27 RX ORDER — METOPROLOL TARTRATE 25 MG/1
25 TABLET, FILM COATED ORAL 2 TIMES DAILY
COMMUNITY
Start: 2021-11-29

## 2021-12-30 ENCOUNTER — OFFICE VISIT (OUTPATIENT)
Dept: MATERNAL FETAL MEDICINE | Facility: CLINIC | Age: 31
End: 2021-12-30
Attending: OBSTETRICS & GYNECOLOGY
Payer: OTHER GOVERNMENT

## 2021-12-30 VITALS — OXYGEN SATURATION: 100 % | HEART RATE: 84 BPM | DIASTOLIC BLOOD PRESSURE: 91 MMHG | SYSTOLIC BLOOD PRESSURE: 124 MMHG

## 2021-12-30 DIAGNOSIS — Z31.69 ENCOUNTER FOR PRECONCEPTION CONSULTATION: ICD-10-CM

## 2021-12-30 DIAGNOSIS — Z87.51 HISTORY OF PRETERM DELIVERY: ICD-10-CM

## 2021-12-30 DIAGNOSIS — Z87.59 HISTORY OF SEVERE PRE-ECLAMPSIA: Primary | ICD-10-CM

## 2021-12-30 DIAGNOSIS — I10 CHRONIC HYPERTENSION: ICD-10-CM

## 2021-12-30 LAB
DRVVT SCREEN RATIO: 0.99
INR PPP: 1 (ref 0.85–1.15)
LA PPP-IMP: NEGATIVE
LUPUS INTERPRETATION: NORMAL
PTT RATIO: 0.92
THROMBIN TIME: 15.3 SECONDS (ref 13–19)

## 2021-12-30 PROCEDURE — 85390 FIBRINOLYSINS SCREEN I&R: CPT | Mod: 26 | Performed by: PATHOLOGY

## 2021-12-30 PROCEDURE — 86146 BETA-2 GLYCOPROTEIN ANTIBODY: CPT

## 2021-12-30 PROCEDURE — 36415 COLL VENOUS BLD VENIPUNCTURE: CPT

## 2021-12-30 PROCEDURE — 85613 RUSSELL VIPER VENOM DILUTED: CPT

## 2021-12-30 PROCEDURE — 99203 OFFICE O/P NEW LOW 30 MIN: CPT | Mod: GC

## 2021-12-30 PROCEDURE — 86147 CARDIOLIPIN ANTIBODY EA IG: CPT

## 2021-12-30 NOTE — PROGRESS NOTES
Pt presents to [facility] for preconception consult due to hx of ptd at 31 weeks and CHTN. Pt met with Dr. Byrd and Dr. Puga. See note in epic for today's discussion and recommendations. Questions answered. At this time pt will go to lab for lab work. No further follow up at [facility] at this time. Discharged stable. Cari Persaud RN

## 2021-12-30 NOTE — Clinical Note
Thank you for involving us in the care of your patient. Please see our note for preconception counseling and future pregnancy recommendations.

## 2021-12-31 LAB
B2 GLYCOPROT1 IGG SERPL IA-ACNC: <0.8 U/ML
B2 GLYCOPROT1 IGM SERPL IA-ACNC: 3.4 U/ML
CARDIOLIPIN IGG SER IA-ACNC: <2 GPL-U/ML
CARDIOLIPIN IGG SER IA-ACNC: NEGATIVE
CARDIOLIPIN IGM SER IA-ACNC: <2 MPL-U/ML
CARDIOLIPIN IGM SER IA-ACNC: NEGATIVE

## 2022-05-08 ENCOUNTER — HEALTH MAINTENANCE LETTER (OUTPATIENT)
Age: 32
End: 2022-05-08

## 2023-01-14 ENCOUNTER — HEALTH MAINTENANCE LETTER (OUTPATIENT)
Age: 33
End: 2023-01-14

## 2023-06-02 ENCOUNTER — HEALTH MAINTENANCE LETTER (OUTPATIENT)
Age: 33
End: 2023-06-02

## 2023-09-25 NOTE — CONSULTS
M consultation:    CC: HTN  HPI: Conchita is a 28 y/o  @ 30 1/7 weeks gestation.  Her pregnancy has been uncomplicated.  Approx 2 weeks ago the patient began noticing swelling in her feet.  She also had a mild HA.  Her CNM had her come to the office and her BP was noted to be 130/80 (higher than baseline of 110/60-70).  Her UPC was negative at that time.  She began checking her BPs at home and they were 150/90s.  She went to [facility] to try a different cuff and it was the same.  The patient was asked to come for assessment and ended up having several severe range BPs and was admitted. She was given oral nifedipine followed by 20mg and 40mg of IV labetalol in order to achieve a mild range BP.       Her 24 hr urine protein was borderline at .24g/24 hr.  Labs have otherwise been normal aside from a Cr that is elevated for pregnancy around 0.95.     She received BMZ x 2 and was on magnesium.  The patient did complain of HA on and off while on magnesium.      Conchita is on labetalol 200mg TID.     Currently she feels well. Denies HA, visual changes, RUQ pain.  Swelling improved.  +FM, but quieter today.     Past Medical History:   Diagnosis Date     Anxiety      Flushing      Proteinuria 2015     Thyroid nodule 2016     Past Surgical History:   Procedure Laterality Date     LAPAROSCOPIC LYSIS ADHESIONS N/A 2020    Procedure: LYSIS, ADHESIONS, LAPAROSCOPIC;  Surgeon: Colton Hernandes MD;  Location:  OR     OTHER SURGICAL HISTORY      partial resection of small bowel as an infant due to obstruction     Social: , denies t/e/d    ROS: 10 pt ROS negative, see HPI for pertinent positives    US: see imaging tab for details    See Epic for detailed preE labs.     O:  Vitals:    20 1628 20 1809 20 2158   BP: (!) 143/84 (!) 157/92 (!) 143/83 (!) 140/80   Resp:       Temp:  99  F (37.2  C)     TempSrc:       SpO2:       Weight:       Height:         Gen: NAD, alert,  comfortable  CV: regular rate  Resp: non labored  Abd: gravid, soft, non tender  Ext: 3+ reflexes, 1 beat of clonus bilaterally, trace swelling    FHT: , mod variability, no decels, +accels, category 1    Impression:   28 y/o  @ 30 1/7 weeks with preE with severe features    I discussed with Conchita Mercadopawan the diagnosis of preE with severe features given her elevated BPs. Despite not having proteinuria currently- severe BPs increase the morbidity and mortality. Women with gestational hypertension who present with severe-range blood pressures should be managed with the same approach as for women with severe preeclampsia.    We discussed that there is no cure or treatment for preeclampsia and that the natural history is for the disease to worsen and that the only intervention that improves the maternal condition is delivery.  We discussed that with a  diagnosis an attempt is made to weigh the maternal and fetal risks of continuing the pregnancy against the risks of premature delivery.  We discussed some of the maternal and fetal risks with preeclampsia including seizure, stroke, uncontrolled hypertension, renal failure, liver failure, DIC, abruption and stillbirth.  We also discussed some of the risks of  delivery including, but not limited to, NICU admission, respiratory morbidity, intracranial hemorrhage, necrotizing enterocolitis, anemia, hyperbilirubinemia, electrolyte abnormalities.  We discussed the role of  corticosteroids in reducing the risks of prematurity-related complications in women deemed to be high risk for  delivery.      We discussed that for preeclampsia with severe features a goal of 34 weeks for delivery.  Severe features are defined as per the ACOG executive summary on hypertension (systolic blood pressure of 160 or higher or diastolic blood pressure of 110 or higher on two occasions at least 4 hours apart while the patient is on bed rest unless  antihypertensive therapy is initiated prior, thrombocytopenia with platelet count <100,000, impaired liver function as indicated by liver enzymes twice normal or severe persistent RUQ/epigastric pain unresponsive to medication or not accounted for by an alternative diagnosis or both, progressive renal insufficiency (serum creatinine of 1.1 mg/dL or doubled), pulmonary edema or new onset cerebral or visual disturbances.  We discussed that not all women are candidates for expectant management until 34 weeks with preeclampsia with severe features and that daily assessment will be made to determine if she can continue to be expectantly managed.  Expectant management of preE with severe features should be inpatient.     Women with PPROM, labor, persistent symptoms, thrombocytopenia <100K, persistently elevated liver function tests to twice normal, new onset renal dysfunction or increasing renal dysfunction, severe fetal growth restriction (EFW <5th percentile), reversed end-diastolic flow on umbilical artery Doppler and severe oligohydramnios are not considered candidates for expectant management with preeclampsia with severe features and should be delivered after steroids are administered if stabilization is possible for 48 hours.      Women with uncontrolled severe hypertension, eclampsia, pulmonary edema, abruption, DIC and nonreassuring fetal status are also not candidates for expectant management and immediate delivery without waiting for steroids should be undertaken (see ACOG executive summary).      The goal blood pressure is 140-155/.  If patient remains in the upper 150s systolic, recommend increasing labetalol.  For women with preeclampsia with severe features the use of magnesium sulfate during delivery (labor or ) and for 24 hours postpartum is recommended.  For women with a  diagnosis of preeclampsia evaluation for antiphospholipid antibody syndrome is recommended.  Aspirin (81 mg  daily) for preeclampsia prophylaxis is recommended in future pregnancies, started prior to 16 weeks.      In the absence of contraindications to expectant management patients should be hospitalized until delivery for severe preeclampsia.  At 34 weeks induction of labor is recommended unless the patient has a contraindication to vaginal delivery.  Magnesium sulfate infusion is recommended during induction of labor (and during  delivery) and for 24 hours post-partum.   Postpartum blood pressure should be monitored for at least 72 hours and then again at 7 days.  Women being discharged should have a thorough review of calling guidelines.      Monitoring should include:  - maternal vital signs, fluid intake and urine output should be monitored at least every 8 hours  - blood pressure may need to be monitored more frequently if severe range  - symptoms of preeclampsia should be monitored at least every 8 hours  - presence of contractions, membrane rupture, abdominal pain and bleeding should be monitored at least every 8 hours  - laboratory resting with CBC (including platelets), liver enzymes and serum creatinine should be performed daily (can be spaced to every other day if stable).  Can repeat proteinuria assessment, however it would not change the recommendations for management.   - TID NSTs  - continuous fetal monitoring is recommended if maternal blood pressure is labile  - BPP twice per week (MFM can perform on Mon/Thurs)  - serial fetal growth every 2-3 weeks.      Thank you for the opportunity to participate in the care of your patient.     Discussed with Dr. Timo Manzano DO [place]  Maternal Fetal Medicine Specialist  Pager: [phone]  Mobile: [phone]    The patient was seen for an inpatient consultation.  I spent a total of 30 minutes face to face with Conchita Boss during today's visit. Over 50% of this time was spent counseling the patient and/or coordinating care on preE with  severe features.           5-Fu Counseling: 5-Fluorouracil Counseling:  I discussed with the patient the risks of 5-fluorouracil including but not limited to erythema, scaling, itching, weeping, crusting, and pain.

## 2024-06-30 ENCOUNTER — HEALTH MAINTENANCE LETTER (OUTPATIENT)
Age: 34
End: 2024-06-30

## 2024-09-04 ENCOUNTER — LAB REQUISITION (OUTPATIENT)
Dept: LAB | Facility: CLINIC | Age: 34
End: 2024-09-04
Payer: OTHER GOVERNMENT

## 2024-09-04 DIAGNOSIS — Z13.1 ENCOUNTER FOR SCREENING FOR DIABETES MELLITUS: ICD-10-CM

## 2024-09-04 DIAGNOSIS — Z31.69 ENCOUNTER FOR OTHER GENERAL COUNSELING AND ADVICE ON PROCREATION: ICD-10-CM

## 2024-09-04 DIAGNOSIS — Z28.39 OTHER UNDERIMMUNIZATION STATUS: ICD-10-CM

## 2024-09-04 DIAGNOSIS — Z13.29 ENCOUNTER FOR SCREENING FOR OTHER SUSPECTED ENDOCRINE DISORDER: ICD-10-CM

## 2024-09-04 DIAGNOSIS — Z78.9 OTHER SPECIFIED HEALTH STATUS: ICD-10-CM

## 2024-09-04 DIAGNOSIS — Z11.59 ENCOUNTER FOR SCREENING FOR OTHER VIRAL DISEASES: ICD-10-CM

## 2024-09-04 PROCEDURE — 82166 ASSAY ANTI-MULLERIAN HORM: CPT | Mod: ORL | Performed by: OBSTETRICS & GYNECOLOGY

## 2024-09-04 PROCEDURE — 84443 ASSAY THYROID STIM HORMONE: CPT | Mod: ORL | Performed by: OBSTETRICS & GYNECOLOGY

## 2024-09-04 PROCEDURE — 86765 RUBEOLA ANTIBODY: CPT | Mod: ORL | Performed by: OBSTETRICS & GYNECOLOGY

## 2024-09-04 PROCEDURE — 83036 HEMOGLOBIN GLYCOSYLATED A1C: CPT | Mod: ORL | Performed by: OBSTETRICS & GYNECOLOGY

## 2024-09-04 PROCEDURE — 86787 VARICELLA-ZOSTER ANTIBODY: CPT | Mod: ORL | Performed by: OBSTETRICS & GYNECOLOGY

## 2024-09-04 PROCEDURE — 86762 RUBELLA ANTIBODY: CPT | Mod: ORL | Performed by: OBSTETRICS & GYNECOLOGY

## 2024-09-05 LAB
HBA1C MFR BLD: 5.1 %
HOLD SPECIMEN: NORMAL
MEV IGG SER IA-ACNC: >300 AU/ML
MEV IGG SER IA-ACNC: POSITIVE
MIS SERPL-MCNC: 2.17 NG/ML (ref 0.58–8.1)
RUBV IGG SERPL QL IA: 0.95 INDEX
RUBV IGG SERPL QL IA: NORMAL
TSH SERPL DL<=0.005 MIU/L-ACNC: 1.16 UIU/ML (ref 0.3–4.2)
VZV IGG SER QL IA: 2443 INDEX
VZV IGG SER QL IA: POSITIVE

## (undated) DEVICE — ESU PENCIL W/HOLSTER E2350H

## (undated) DEVICE — ESU GROUND PAD UNIVERSAL W/O CORD

## (undated) DEVICE — SUCTION CANISTER MEDIVAC LINER 3000ML W/LID 65651-530

## (undated) DEVICE — PREP CHLORAPREP 26ML TINTED ORANGE  260815

## (undated) DEVICE — ESU ENDO SCISSORS 5MM CVD 5DCS

## (undated) DEVICE — SOL NACL 0.9% IRRIG 1000ML BOTTLE 2F7124

## (undated) DEVICE — SU VICRYL 3-0 SH 27" J316H

## (undated) DEVICE — SU VICRYL 4-0 FS-1 27" J441H

## (undated) DEVICE — GLOVE PROTEXIS W/NEU-THERA 6.5  2D73TE65

## (undated) DEVICE — PACK LAP CHOLE SLC15LCFSD

## (undated) DEVICE — PACK C-SECTION LF PL15OTA83B

## (undated) DEVICE — SU VICRYL 3-0 TIE 12X18" J904T

## (undated) DEVICE — CATH TRAY FOLEY 16FR BARDEX W/DRAIN BAG STATLOCK 300316A

## (undated) DEVICE — SOL WATER IRRIG 1000ML BOTTLE 2F7114

## (undated) DEVICE — BLADE CLIPPER 4406

## (undated) DEVICE — SU VICRYL 0 UR-6 27" J603H

## (undated) DEVICE — LINEN C-SECTION 5415

## (undated) DEVICE — SUCTION IRR STRYKERFLOW II W/TIP 250-070-520

## (undated) DEVICE — SYSTEM CLEARIFY VISUALIZATION 21-345

## (undated) DEVICE — SU VICRYL 4-0 PS-2 18" UND J496H

## (undated) DEVICE — GLOVE GAMMEX DERMAPRENE ULTRA SZ 8.5 LF 8517

## (undated) DEVICE — ENDO TROCAR SLEEVE KII Z-THREADED 05X100MM CTS02

## (undated) DEVICE — LINEN TOWEL PACK X5 5464

## (undated) DEVICE — SU VICRYL 0 CT 36" J358H

## (undated) DEVICE — ENDO TROCAR FIRST ENTRY KII FIOS Z-THRD 05X100MM CTF03

## (undated) RX ORDER — KETOROLAC TROMETHAMINE 30 MG/ML
INJECTION, SOLUTION INTRAMUSCULAR; INTRAVENOUS
Status: DISPENSED
Start: 2020-02-21

## (undated) RX ORDER — ONDANSETRON 2 MG/ML
INJECTION INTRAMUSCULAR; INTRAVENOUS
Status: DISPENSED
Start: 2020-08-06

## (undated) RX ORDER — MORPHINE SULFATE 1 MG/ML
INJECTION, SOLUTION EPIDURAL; INTRATHECAL; INTRAVENOUS
Status: DISPENSED
Start: 2020-08-06

## (undated) RX ORDER — FENTANYL CITRATE 50 UG/ML
INJECTION, SOLUTION INTRAMUSCULAR; INTRAVENOUS
Status: DISPENSED
Start: 2020-02-20

## (undated) RX ORDER — LIDOCAINE HYDROCHLORIDE 20 MG/ML
INJECTION, SOLUTION EPIDURAL; INFILTRATION; INTRACAUDAL; PERINEURAL
Status: DISPENSED
Start: 2020-02-21

## (undated) RX ORDER — NEOSTIGMINE METHYLSULFATE 1 MG/ML
VIAL (ML) INJECTION
Status: DISPENSED
Start: 2020-02-21

## (undated) RX ORDER — OXYTOCIN/0.9 % SODIUM CHLORIDE 30/500 ML
PLASTIC BAG, INJECTION (ML) INTRAVENOUS
Status: DISPENSED
Start: 2020-08-06

## (undated) RX ORDER — FENTANYL CITRATE 50 UG/ML
INJECTION, SOLUTION INTRAMUSCULAR; INTRAVENOUS
Status: DISPENSED
Start: 2020-08-06

## (undated) RX ORDER — LIDOCAINE HYDROCHLORIDE 20 MG/ML
INJECTION, SOLUTION EPIDURAL; INFILTRATION; INTRACAUDAL; PERINEURAL
Status: DISPENSED
Start: 2020-02-20

## (undated) RX ORDER — ONDANSETRON 2 MG/ML
INJECTION INTRAMUSCULAR; INTRAVENOUS
Status: DISPENSED
Start: 2020-02-20

## (undated) RX ORDER — GLYCOPYRROLATE 0.2 MG/ML
INJECTION, SOLUTION INTRAMUSCULAR; INTRAVENOUS
Status: DISPENSED
Start: 2020-02-21

## (undated) RX ORDER — PROPOFOL 10 MG/ML
INJECTION, EMULSION INTRAVENOUS
Status: DISPENSED
Start: 2020-02-20

## (undated) RX ORDER — BUPIVACAINE HYDROCHLORIDE 2.5 MG/ML
INJECTION, SOLUTION EPIDURAL; INFILTRATION; INTRACAUDAL
Status: DISPENSED
Start: 2020-02-20